# Patient Record
Sex: MALE | Race: WHITE | Employment: OTHER | URBAN - METROPOLITAN AREA
[De-identification: names, ages, dates, MRNs, and addresses within clinical notes are randomized per-mention and may not be internally consistent; named-entity substitution may affect disease eponyms.]

---

## 2017-01-16 ENCOUNTER — APPOINTMENT (OUTPATIENT)
Dept: GENERAL RADIOLOGY | Age: 67
End: 2017-01-16
Attending: EMERGENCY MEDICINE
Payer: COMMERCIAL

## 2017-01-16 ENCOUNTER — HOSPITAL ENCOUNTER (EMERGENCY)
Age: 67
Discharge: HOME | End: 2017-01-16
Attending: EMERGENCY MEDICINE | Admitting: EMERGENCY MEDICINE
Payer: COMMERCIAL

## 2017-01-16 VITALS
RESPIRATION RATE: 20 BRPM | HEIGHT: 72 IN | HEART RATE: 93 BPM | BODY MASS INDEX: 39.86 KG/M2 | OXYGEN SATURATION: 97 % | DIASTOLIC BLOOD PRESSURE: 53 MMHG | TEMPERATURE: 97.9 F | WEIGHT: 294.31 LBS | SYSTOLIC BLOOD PRESSURE: 110 MMHG

## 2017-01-16 DIAGNOSIS — J20.9 ACUTE BRONCHITIS, UNSPECIFIED ORGANISM: Primary | ICD-10-CM

## 2017-01-16 LAB
ALBUMIN SERPL BCP-MCNC: 3.4 G/DL (ref 3.5–5)
ALBUMIN/GLOB SERPL: 0.8 {RATIO} (ref 1.1–2.2)
ALP SERPL-CCNC: 118 U/L (ref 45–117)
ALT SERPL-CCNC: 86 U/L (ref 12–78)
ANION GAP BLD CALC-SCNC: 10 MMOL/L (ref 5–15)
AST SERPL W P-5'-P-CCNC: 42 U/L (ref 15–37)
ATRIAL RATE: 100 BPM
BASOPHILS # BLD AUTO: 0 K/UL (ref 0–0.1)
BASOPHILS # BLD: 0 % (ref 0–1)
BILIRUB SERPL-MCNC: 0.3 MG/DL (ref 0.2–1)
BUN SERPL-MCNC: 24 MG/DL (ref 6–20)
BUN/CREAT SERPL: 18 (ref 12–20)
CALCIUM SERPL-MCNC: 9.1 MG/DL (ref 8.5–10.1)
CALCULATED P AXIS, ECG09: 18 DEGREES
CALCULATED R AXIS, ECG10: -53 DEGREES
CALCULATED T AXIS, ECG11: 42 DEGREES
CHLORIDE SERPL-SCNC: 106 MMOL/L (ref 97–108)
CK SERPL-CCNC: 297 U/L (ref 39–308)
CO2 SERPL-SCNC: 26 MMOL/L (ref 21–32)
CREAT SERPL-MCNC: 1.34 MG/DL (ref 0.7–1.3)
DIAGNOSIS, 93000: NORMAL
EOSINOPHIL # BLD: 0.1 K/UL (ref 0–0.4)
EOSINOPHIL NFR BLD: 1 % (ref 0–7)
ERYTHROCYTE [DISTWIDTH] IN BLOOD BY AUTOMATED COUNT: 15 % (ref 11.5–14.5)
GLOBULIN SER CALC-MCNC: 4.2 G/DL (ref 2–4)
GLUCOSE SERPL-MCNC: 215 MG/DL (ref 65–100)
HCT VFR BLD AUTO: 41.1 % (ref 36.6–50.3)
HGB BLD-MCNC: 13 G/DL (ref 12.1–17)
LYMPHOCYTES # BLD AUTO: 24 % (ref 12–49)
LYMPHOCYTES # BLD: 2.4 K/UL (ref 0.8–3.5)
MCH RBC QN AUTO: 26.5 PG (ref 26–34)
MCHC RBC AUTO-ENTMCNC: 31.6 G/DL (ref 30–36.5)
MCV RBC AUTO: 83.9 FL (ref 80–99)
MONOCYTES # BLD: 0.9 K/UL (ref 0–1)
MONOCYTES NFR BLD AUTO: 9 % (ref 5–13)
NEUTS SEG # BLD: 6.8 K/UL (ref 1.8–8)
NEUTS SEG NFR BLD AUTO: 66 % (ref 32–75)
P-R INTERVAL, ECG05: 144 MS
PLATELET # BLD AUTO: 298 K/UL (ref 150–400)
POTASSIUM SERPL-SCNC: 4.4 MMOL/L (ref 3.5–5.1)
PROT SERPL-MCNC: 7.6 G/DL (ref 6.4–8.2)
Q-T INTERVAL, ECG07: 356 MS
QRS DURATION, ECG06: 96 MS
QTC CALCULATION (BEZET), ECG08: 459 MS
RBC # BLD AUTO: 4.9 M/UL (ref 4.1–5.7)
SODIUM SERPL-SCNC: 142 MMOL/L (ref 136–145)
TROPONIN I SERPL-MCNC: <0.04 NG/ML
VENTRICULAR RATE, ECG03: 100 BPM
WBC # BLD AUTO: 10.1 K/UL (ref 4.1–11.1)

## 2017-01-16 PROCEDURE — 71020 XR CHEST PA LAT: CPT

## 2017-01-16 PROCEDURE — 85025 COMPLETE CBC W/AUTO DIFF WBC: CPT | Performed by: EMERGENCY MEDICINE

## 2017-01-16 PROCEDURE — 36415 COLL VENOUS BLD VENIPUNCTURE: CPT | Performed by: EMERGENCY MEDICINE

## 2017-01-16 PROCEDURE — 93005 ELECTROCARDIOGRAM TRACING: CPT

## 2017-01-16 PROCEDURE — 82550 ASSAY OF CK (CPK): CPT | Performed by: EMERGENCY MEDICINE

## 2017-01-16 PROCEDURE — 84484 ASSAY OF TROPONIN QUANT: CPT | Performed by: EMERGENCY MEDICINE

## 2017-01-16 PROCEDURE — 80053 COMPREHEN METABOLIC PANEL: CPT | Performed by: EMERGENCY MEDICINE

## 2017-01-16 PROCEDURE — 99284 EMERGENCY DEPT VISIT MOD MDM: CPT

## 2017-01-16 RX ORDER — CODEINE PHOSPHATE AND GUAIFENESIN 10; 100 MG/5ML; MG/5ML
5 SOLUTION ORAL
Qty: 118 ML | Refills: 0 | Status: SHIPPED | OUTPATIENT
Start: 2017-01-16 | End: 2017-05-12

## 2017-01-16 RX ORDER — BENZONATATE 100 MG/1
100 CAPSULE ORAL
Qty: 20 CAP | Refills: 0 | Status: SHIPPED | OUTPATIENT
Start: 2017-01-16 | End: 2017-01-23

## 2017-01-17 NOTE — ED NOTES
Discharge instructions given to and reviewed with patient by Dr. Javan Toro. Patient verbalized his understanding and was discharged home ambulatory in [facility].

## 2017-01-17 NOTE — ED NOTES
Assumed care of patient fro shift change. Patient alert and oriented x 3 and on cardiac monitor x 3. VSS. Patient deneis c/o pain or SOB at this time. Lumgs CTAB. Patient in NAD and watching television. Call bell at side. Wife in room. Dr. Nomi Bae into update.

## 2017-01-17 NOTE — DISCHARGE INSTRUCTIONS
Bronchitis: Care Instructions  Your Care Instructions    Bronchitis is inflammation of the bronchial tubes, which carry air to the lungs. The tubes swell and produce mucus, or phlegm. The mucus and inflamed bronchial tubes make you cough. You may have trouble breathing. Most cases of bronchitis are caused by viruses like those that cause colds. Antibiotics usually do not help and they may be harmful. Bronchitis usually develops rapidly and lasts about 2 to 3 weeks in otherwise healthy people. Follow-up care is a key part of your treatment and safety. Be sure to make and go to all appointments, and call your doctor if you are having problems. It's also a good idea to know your test results and keep a list of the medicines you take. How can you care for yourself at home? · Take all medicines exactly as prescribed. Call your doctor if you think you are having a problem with your medicine. · Get some extra rest.  · Take an over-the-counter pain medicine, such as acetaminophen (Tylenol), ibuprofen (Advil, Motrin), or naproxen (Aleve) to reduce fever and relieve body aches. Read and follow all instructions on the label. · Do not take two or more pain medicines at the same time unless the doctor told you to. Many pain medicines have acetaminophen, which is Tylenol. Too much acetaminophen (Tylenol) can be harmful. · Take an over-the-counter cough medicine that contains dextromethorphan to help quiet a dry, hacking cough so that you can sleep. Avoid cough medicines that have more than one active ingredient. Read and follow all instructions on the label. · Breathe moist air from a humidifier, hot shower, or sink filled with hot water. The heat and moisture will thin mucus so you can cough it out. · Do not smoke. Smoking can make bronchitis worse. If you need help quitting, talk to your doctor about stop-smoking programs and medicines. These can increase your chances of quitting for good.   When should you call for help? Call 911 anytime you think you may need emergency care. For example, call if:  · You have severe trouble breathing. Call your doctor now or seek immediate medical care if:  · You have new or worse trouble breathing. · You cough up dark brown or bloody mucus (sputum). · You have a new or higher fever. · You have a new rash. Watch closely for changes in your health, and be sure to contact your doctor if:  · You cough more deeply or more often, especially if you notice more mucus or a change in the color of your mucus. · You are not getting better as expected. Where can you learn more? Go to http://massimo-ese.info/. Enter H333 in the search box to learn more about \"Bronchitis: Care Instructions. \"  Current as of: May 23, 2016  Content Version: 11.1  © 8795-9716 [employer]. Care instructions adapted under license by [employer] (which disclaims liability or warranty for this information). If you have questions about a medical condition or this instruction, always ask your healthcare professional. [address] any warranty or liability for your use of this information.

## 2017-01-17 NOTE — ED PROVIDER NOTES
HPI Comments: Sarath Sierra is a 73 y.o. Male former tobacco user (1.5 ppd x 25yrs) who has a h/o GERD, CAD (4 stents), DM and HTN presents to ED [facility] ED ambulatory with cc intermittent symptoms of congestion, and associated symptoms of a productive \"green sputum\" cough, wheezing, diaphoresis and chills x 3 weeks. The patient has seen his PCP in regards to his current symptoms and she prescribed him Augmentin on 12/30 and Azithromycin on 1/9 as well as prednisone. The patient states that these interventions have kept his symptoms from getting worse, however he only reports minimal improvement overall. Today in his PCP's office he was given two nebulizer treatments however due to his wheezing symptoms he was referred to the ED for further evaluation. The patient denies any h/o asthma, bronchitis, COPD, CHF, DVT or PE. He denies all other symptoms at this time, including any sinus pressure, chest pain, fevers, sob, hemoptysis or nausea. Of note, triage note reports complaint of dyspnea but patient denies every having dyspnea or chest pain. PCP: Dr. Randal Willoughby      There are no other complaints changes or physical findings at this time  Written by Maddi Robbins ED Scribshanda as dictated by Martita Coburn MD.    The history is provided by the patient.         Past Medical History:   Diagnosis Date    Adverse effect of anesthesia      sleep apnea   uses cpap machine doesnsot generally use it away from home per pt     Arthritis of knee     CAD (coronary artery disease)      s/p stents 2008 4 stents     GERD (gastroesophageal reflux disease)     JOE (obstructive sleep apnea)      on CPAP    Other and unspecified hyperlipidemia     Type II or unspecified type diabetes mellitus without mention of complication, uncontrolled     Unspecified essential hypertension        Past Surgical History:   Procedure Laterality Date    Hx knee arthroscopy       x2 on right    Hx orthopaedic  7/27/15     LEFT TIBIAL OPEN REDUCTION INTERNAL FIXATION     Pr cardiac surg procedure unlist       x4    Hx heent       left orbital fracture with plate    Hx orthopaedic       right shoulder surgery     Hx orthopaedic       s/p fall sheba in left leg     Hx cervical fusion  March 2016     C6-7         Family History:   Problem Relation Age of Onset    Diabetes Mother     Heart Disease Mother      in her 46s    Heart Disease Father      in his 62s    Diabetes Brother     Heart Disease Brother      congenital, valve replacement    Heart Surgery Brother     Diabetes Brother     No Known Problems Sister     Stroke Neg Hx        Social History     Social History    Marital status:      Spouse name: N/A    Number of children: N/A    Years of education: N/A     Occupational History    Not on file. Social History Main Topics    Smoking status: Former Smoker     Packs/day: 1.50     Years: 25.00     Quit date: 2/1/2006    Smokeless tobacco: Never Used    Alcohol use No    Drug use: Yes     Special: Prescription, OTC    Sexual activity: Not on file     Other Topics Concern    Not on file     Social History Narrative    Lives in [facility] with wife of 9 years, 2 daughters and 1 son from a previous marriage and 3 step-children. 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 Likes [employer]. ALLERGIES: Review of patient's allergies indicates no known allergies. Review of Systems   Constitutional: Positive for chills and diaphoresis. Negative for fatigue and fever. HENT: Positive for congestion. Negative for rhinorrhea, sinus pressure and sore throat. Eyes: Negative for pain, discharge and visual disturbance. Respiratory: Positive for cough and wheezing. Negative for chest tightness and shortness of breath. Cardiovascular: Negative for chest pain, palpitations and leg swelling. Gastrointestinal: Negative for abdominal pain, constipation, diarrhea, nausea and vomiting. Genitourinary: Negative for dysuria, frequency and hematuria. Musculoskeletal: Negative for arthralgias, back pain and myalgias. Skin: Negative for rash. Neurological: Negative for dizziness, weakness, light-headedness and headaches. Psychiatric/Behavioral: Negative. All other systems reviewed and are negative. Patient Vitals for the past 12 hrs:   Temp Pulse Resp BP SpO2   01/16/17 1930 - - - 110/53 97 %   01/16/17 1912 - 93 20 120/61 95 %   01/16/17 1617 - 94 22 127/61 96 %   01/16/17 1412 97.9 °F (36.6 °C) (!) 106 22 114/63 96 %       Physical Exam   Constitutional: He is oriented to person, place, and time. He appears well-developed and well-nourished. No distress. HENT:   Head: Normocephalic and atraumatic. Nose: Right sinus exhibits no maxillary sinus tenderness and no frontal sinus tenderness. Left sinus exhibits no maxillary sinus tenderness and no frontal sinus tenderness. Mouth/Throat: Oropharynx is clear and moist. No oropharyngeal exudate. Eyes: EOM are normal. Right eye exhibits no discharge. Left eye exhibits no discharge. No scleral icterus. Neck: Normal range of motion. Neck supple. No tracheal deviation present. Cardiovascular: Normal rate, regular rhythm, normal heart sounds and intact distal pulses. Exam reveals no gallop and no friction rub. No murmur heard. Pulmonary/Chest: Effort normal and breath sounds normal. No respiratory distress. He has no wheezes. He has no rales. Abdominal: Soft. He exhibits no distension. There is no tenderness. Musculoskeletal: Normal range of motion. He exhibits edema (Trace BLE). Lymphadenopathy:     He has no cervical adenopathy. Neurological: He is alert and oriented to person, place, and time. Skin: Skin is warm and dry. No rash noted. Psychiatric: He has a normal mood and affect.         MDM  Number of Diagnoses or Management Options  Acute bronchitis, unspecified organism:   Diagnosis management comments: Patient presents to ED with congestion and cough. He has completed two antibiotics with improvement in symptoms but symptoms have not resolved. He denies chest pain and dyspnea. He has a normal pulmonary exam without wheezing. CBC, CMP, CXR unremarkable. Yanci sent from triage which were also unremarkable. Symptoms likely secondary to bronchitis. No indication for additional antibiotics or steroids at this time given exam.  Will provide symptomatic treatment and have patient follow up with PCP. Discussed results, prescriptions and follow up plan with patient. Provided customary return to ED instructions. Patient expressed understanding. Aamir Parihk MD           Amount and/or Complexity of Data Reviewed  Clinical lab tests: reviewed and ordered  Tests in the radiology section of CPT®: ordered and reviewed  Tests in the medicine section of CPT®: ordered and reviewed  Review and summarize past medical records: yes  Independent visualization of images, tracings, or specimens: yes    Patient Progress  Patient progress: stable    ED Course       Procedures      EKG interpretation: (Preliminary) 1418  Rhythm: normal sinus rhythm; and regular . Rate (approx.): 100; Axis: normal; P wave: normal; QRS interval: normal ; ST/T wave: normal; LVH; Left anterior vesicular block.       LABORATORY TESTS:  Recent Results (from the past 12 hour(s))   EKG, 12 LEAD, INITIAL    Collection Time: 01/16/17  2:18 PM   Result Value Ref Range    Ventricular Rate 100 BPM    Atrial Rate 100 BPM    P-R Interval 144 ms    QRS Duration 96 ms    Q-T Interval 356 ms    QTC Calculation (Bezet) 459 ms    Calculated P Axis 18 degrees    Calculated R Axis -53 degrees    Calculated T Axis 42 degrees    Diagnosis       Normal sinus rhythm  Incomplete right bundle branch block  Left anterior fascicular block  Minimal voltage criteria for LVH, may be normal variant  Abnormal ECG  When compared with ECG of 26-JUL-2015 21:17,  T wave inversion no longer evident in Inferior leads  Nonspecific T wave abnormality, improved in Lateral leads  Confirmed by Michael Calderon ([id number]) on 1/16/2017 6:17:23 PM     CBC WITH AUTOMATED DIFF    Collection Time: 01/16/17  3:10 PM   Result Value Ref Range    WBC 10.1 4.1 - 11.1 K/uL    RBC 4.90 4. 10 - 5.70 M/uL    HGB 13.0 12.1 - 17.0 g/dL    HCT 41.1 36.6 - 50.3 %    MCV 83.9 80.0 - 99.0 FL    MCH 26.5 26.0 - 34.0 PG    MCHC 31.6 30.0 - 36.5 g/dL    RDW 15.0 (H) 11.5 - 14.5 %    PLATELET 755 869 - 851 K/uL    NEUTROPHILS 66 32 - 75 %    LYMPHOCYTES 24 12 - 49 %    MONOCYTES 9 5 - 13 %    EOSINOPHILS 1 0 - 7 %    BASOPHILS 0 0 - 1 %    ABS. NEUTROPHILS 6.8 1.8 - 8.0 K/UL    ABS. LYMPHOCYTES 2.4 0.8 - 3.5 K/UL    ABS. MONOCYTES 0.9 0.0 - 1.0 K/UL    ABS. EOSINOPHILS 0.1 0.0 - 0.4 K/UL    ABS. BASOPHILS 0.0 0.0 - 0.1 K/UL   METABOLIC PANEL, COMPREHENSIVE    Collection Time: 01/16/17  3:10 PM   Result Value Ref Range    Sodium 142 136 - 145 mmol/L    Potassium 4.4 3.5 - 5.1 mmol/L    Chloride 106 97 - 108 mmol/L    CO2 26 21 - 32 mmol/L    Anion gap 10 5 - 15 mmol/L    Glucose 215 (H) 65 - 100 mg/dL    BUN 24 (H) 6 - 20 MG/DL    Creatinine 1.34 (H) 0.70 - 1.30 MG/DL    BUN/Creatinine ratio 18 12 - 20      GFR est AA >60 >60 ml/min/1.73m2    GFR est non-AA 54 (L) >60 ml/min/1.73m2    Calcium 9.1 8.5 - 10.1 MG/DL    Bilirubin, total 0.3 0.2 - 1.0 MG/DL    ALT 86 (H) 12 - 78 U/L    AST 42 (H) 15 - 37 U/L    Alk.  phosphatase 118 (H) 45 - 117 U/L    Protein, total 7.6 6.4 - 8.2 g/dL    Albumin 3.4 (L) 3.5 - 5.0 g/dL    Globulin 4.2 (H) 2.0 - 4.0 g/dL    A-G Ratio 0.8 (L) 1.1 - 2.2     CK W/ REFLX CKMB    Collection Time: 01/16/17  3:10 PM   Result Value Ref Range     39 - 308 U/L   TROPONIN I    Collection Time: 01/16/17  3:10 PM   Result Value Ref Range    Troponin-I, Qt. <0.04 <0.05 ng/mL       IMAGING RESULTS:  CXR Results  (Last 48 hours)               01/16/17 7537  XR CHEST PA LAT Final result    Impression: IMPRESSION: No acute abnormality. Narrative:  EXAM:  XR CHEST PA LAT. INDICATION: Short of breath. COMPARISON: 3/8/2016. FINDINGS:    PA and lateral radiographs of the chest were obtained. Lungs: The lungs are clear of mass, nodule, airspace disease or edema. Pleura: There is no pleural effusion or pneumothorax. Mediastinum: The cardiac and mediastinal contours and pulmonary vascularity are   normal.   Bones and soft tissues: There is a plate and screws in the cervical spine. There   are degenerative changes of the thoracic spine. MEDICATIONS GIVEN:  Medications - No data to display    IMPRESSION:  1. Acute bronchitis, unspecified organism        PLAN:  1. Discharge Medication List as of 1/16/2017  7:33 PM      START taking these medications    Details   guaiFENesin-codeine (ROBITUSSIN AC) 100-10 mg/5 mL solution Take 5 mL by mouth three (3) times daily as needed for Cough. Max Daily Amount: 15 mL. , Print, Disp-118 mL, R-0         CONTINUE these medications which have NOT CHANGED    Details   Liraglutide (VICTOZA 3-STEFANIE) 0.6 mg/0.1 mL (18 mg/3 mL) sub-q pen INJECT 1.8 MG UNDER THE SKIN DAILY, Normal, Disp-27 mL, R-3      insulin lispro (HUMALOG) 100 unit/mL kwikpen Inject 30 units before dinner, Normal, Disp-30 mL, R-3      atorvastatin (LIPITOR) 80 mg tablet Take 1 Tab by mouth daily. , Normal, Disp-90 Tab, R-3      atenolol (TENORMIN) 25 mg tablet Take 1 Tab by mouth daily. , Normal, Disp-90 Tab, R-3      FREESTYLE LITE STRIPS strip TEST THREE TIMES A DAY, Normal, Disp-200 Strip, R-2, TIO      lisinopril (PRINIVIL, ZESTRIL) 10 mg tablet TAKE 1 TABLET DAILY, Normal, Disp-90 Tab, R-3      metFORMIN (GLUCOPHAGE) 1,000 mg tablet TAKE 1 TABLET TWICE A DAY WITH MEALS, Normal, Disp-180 Tab, R-3      docusate sodium (COLACE) 100 mg capsule Take 100 mg by mouth nightly., Historical Med      lansoprazole (PREVACID) 30 mg capsule Take  by mouth Daily (before breakfast). , Historical Med      FREESTYLE SYSTEM KIT monitoring kit Test 3 times daily Dx: 250.02, Normal, Disp-1 Kit, R-0, TIO      FREESTYLE LANCETS 28 gauge misc Test 3 times daily Dx: 250.02, Normal, Disp-100 Each, R-11, TIO      aspirin delayed-release 81 mg tablet Take 162 mg by mouth nightly., Historical Med      BD INSULIN PEN NEEDLE UF SHORT 31 X 5/16 \" ndle USE AS DIRECTED 3 TIMES DAILY, Normal, Disp-300 Package, R-3, TIO      CETIRIZINE HCL (ZYRTEC PO) Take 10 mg by mouth nightly., Historical Med      coenzyme q10 200 mg Cap Take  by mouth nightly., Historical Med      GLUCOSAMINE SULFATE (GLUCOSAMINE PO) Take  by mouth as directed., Historical Med      MULTIVITAMINS (MULTIVITAMIN PO) Take  by mouth daily. , Historical Med      diclofenac EC (VOLTAREN) 75 mg EC tablet Historical Med      insulin glargine (LANTUS SOLOSTAR) 100 unit/mL (3 mL) pen Inject 80 units in the morning and 80 units in the evening as 2 separate injections of 40 units in different locations, Normal, Disp-150 mL, R-3           2. Follow-up Information     Follow up With Details Comments Contact Info    Mo Hurst MD In 3 days  [address] [facility]  [address]  [address] [phone]      [facility] EMERGENCY DEPT  As needed, If symptoms worsen [address]  [phone]        Return to ED if worse     Discharge Note:  7:45 PM  The patient is ready for discharge. The patient's signs, symptoms, diagnosis, and discharge instructions have been discussed and the patient has conveyed their understanding. The patient is to follow up as recommended or return to the ER should their symptoms worsen. Plan has been discussed and the patient is in agreement. This note is prepared by Gricelda Manrique acting as Scribe for Master Castillo MD.    Master Castillo MD: The Scribe's documentation has been prepared under my direction and personally reviewed by me in its entirety.  I confirm that the note above accurately reflects all work, treatment, procedures, and medical decision making performed by me.

## 2017-01-17 NOTE — ED NOTES
Bedside and Verbal shift change report given to [address] [place] [address] (oncoming nurse) by Jaime Groves RN (offgoing nurse). Report included the following information SBAR and ED Summary.

## 2017-01-17 NOTE — ED NOTES
Pt presents to ED for SOB x 3 weeks, cough x productive with green mucus. Pt went to PCP and was sent to ED. Pt alert and oriented x 4. Pt denies other symptoms. No chest pain. Pt reports sore throat and throat tightness.

## 2017-03-03 RX ORDER — BLOOD-GLUCOSE METER
KIT MISCELLANEOUS
Qty: 300 STRIP | Refills: 3 | Status: SHIPPED | OUTPATIENT
Start: 2017-03-03 | End: 2018-05-31 | Stop reason: SDUPTHER

## 2017-05-05 ENCOUNTER — LAB ONLY (OUTPATIENT)
Dept: ENDOCRINOLOGY | Age: 67
End: 2017-05-05

## 2017-05-12 ENCOUNTER — OFFICE VISIT (OUTPATIENT)
Dept: ENDOCRINOLOGY | Age: 67
End: 2017-05-12

## 2017-05-12 VITALS
HEIGHT: 72 IN | BODY MASS INDEX: 41.23 KG/M2 | DIASTOLIC BLOOD PRESSURE: 72 MMHG | WEIGHT: 304.4 LBS | SYSTOLIC BLOOD PRESSURE: 146 MMHG | HEART RATE: 83 BPM

## 2017-05-12 DIAGNOSIS — E78.5 HYPERLIPIDEMIA LDL GOAL <70: ICD-10-CM

## 2017-05-12 DIAGNOSIS — I10 ESSENTIAL HYPERTENSION, BENIGN: ICD-10-CM

## 2017-05-12 NOTE — PROGRESS NOTES
Chief Complaint   Patient presents with    Diabetes     pcp and pharmacy confirmed    Other     consent form signed to obtain eye exam     History of Present Illness: Desi Caldwell is a 73 y.o. male here for follow up of diabetes. Weight stable since last visit in 12/16. Has been taking 30 units of humalog before dinner and taking 80 units of lantus bid as 2 separate shots of 40 units in different locations. Initially this was working well and his sugars were in the 100-120 range in the morning but then needed 2-3 courses of steroids for respiratory infection and after this hasn't been able to get his sugars much below 180 over the past month and has seen readings up to 200-300 at times. Has felt more in a fog with the higher sugars. Still taking metformin and victoza. Compliant with BP and lipid regimen. Has tried to start exercising using the total [employer] for 15-30 minutes over the past 3 weeks. Current Outpatient Prescriptions   Medication Sig    FREESTYLE LITE STRIPS strip USE TO TEST THREE TIMES A DAY    Liraglutide (VICTOZA 3-STEFANIE) 0.6 mg/0.1 mL (18 mg/3 mL) sub-q pen INJECT 1.8 MG UNDER THE SKIN DAILY    insulin lispro (HUMALOG) 100 unit/mL kwikpen Inject 30 units before dinner    diclofenac EC (VOLTAREN) 75 mg EC tablet     atorvastatin (LIPITOR) 80 mg tablet Take 1 Tab by mouth daily.  atenolol (TENORMIN) 25 mg tablet Take 1 Tab by mouth daily.  lisinopril (PRINIVIL, ZESTRIL) 10 mg tablet TAKE 1 TABLET DAILY    insulin glargine (LANTUS SOLOSTAR) 100 unit/mL (3 mL) pen Inject 80 units in the morning and 80 units in the evening as 2 separate injections of 40 units in different locations    metFORMIN (GLUCOPHAGE) 1,000 mg tablet TAKE 1 TABLET TWICE A DAY WITH MEALS    docusate sodium (COLACE) 100 mg capsule Take 100 mg by mouth nightly.  lansoprazole (PREVACID) 30 mg capsule Take  by mouth Daily (before breakfast).     FREESTYLE SYSTEM KIT monitoring kit Test 3 times daily Dx: 250.02    FREESTYLE LANCETS 28 gauge misc Test 3 times daily Dx: 250.02    aspirin delayed-release 81 mg tablet Take 162 mg by mouth nightly.  BD INSULIN PEN NEEDLE UF SHORT 31 X 5/16 \" ndle USE AS DIRECTED 3 TIMES DAILY    CETIRIZINE HCL (ZYRTEC PO) Take 10 mg by mouth nightly.  coenzyme q10 200 mg Cap Take  by mouth nightly.  GLUCOSAMINE SULFATE (GLUCOSAMINE PO) Take  by mouth as directed.  MULTIVITAMINS (MULTIVITAMIN PO) Take  by mouth daily. No current facility-administered medications for this visit. No Known Allergies     Review of Systems:  - Eyes: no blurry vision or double vision  - Cardiovascular: no chest pain  - Respiratory: no shortness of breath  - Musculoskeletal: no myalgias  - Neurological: no numbness/tingling in extremities    Physical Examination:  Blood pressure 146/72, pulse 83, height 6' (1.829 m), weight 304 lb 6.4 oz (138.1 kg). - General: pleasant, no distress, good eye contact   - Neck: no carotid bruits  - Cardiovascular: regular, normal rate, nl s1 and s2, no m/r/g,   - Respiratory: clear bilaterally  - Integumentary: no edema,   - Psychiatric: normal mood and affect    Data Reviewed:   Component      Latest Ref Rng & Units 5/5/2017 5/5/2017           4:34 PM  4:34 PM   Glucose      65 - 99 mg/dL 204 (H)    BUN      8 - 27 mg/dL 14    Creatinine      0.76 - 1.27 mg/dL 0.77    GFR est non-AA      >59 mL/min/1.73 95    GFR est AA      >59 mL/min/1.73 110    BUN/Creatinine ratio      10 - 24 18    Sodium      134 - 144 mmol/L 142    Potassium      3.5 - 5.2 mmol/L 4.3    Chloride      96 - 106 mmol/L 104    CO2      18 - 29 mmol/L 20    Calcium      8.6 - 10.2 mg/dL 9.3    Hemoglobin A1c, (calculated)      4.8 - 5.6 %  8.5 (H)   Estimated average glucose      mg/dL  197       Assessment/Plan:     1.  DM w/o complication type II, uncontrolled (250.02) his most recent Hgb A1c was 8.5% in 5/17 up from 8.2% in 11/16 up from 7.7% in 6/16 down from 8.4% in 3/16 up from 7.5% in 10/15 up from 7.4% in 5/15 stable from 12/14 down from 7.6% in 7/14 stable from 3/14 up from 7.5% in 11/13 down from 7.7% in July up from7.1% in March 2013 down from 7.7% in Nov down from 8.3% in August up from 7.7% in April stable from 7.6% in January 2012 down from 8.8% in October 2011 up from 8.4% in July, 7.4% in April stable from 7.5% in January up from 6.7% in October 2010 down from 8.6% in June. His A1c has been higher after courses of steroids so likely has developed some glucose toxicity and insulin resistance. Provided a correction humalog scale to use as below and if this doesn't work after 2 weeks, will increase the lantus to 90 bid. - cont Lantus 80 units in am and 80 units in pm as 2 separate injections of 40 units   - cont Victoza 1.8 mg daily   - cont Metformin 1000 mg bid  - cont humalog 30 units before dinner + 5 units for every 50 mg/dl above 150 mg/dl  - take humalog 5 units for every 50 mg/dl above 150 mg/dl at breakfast and bedtime  - check bs 3 times daily  - optho UTD 4/15--will obtain report  - microalbumin nl 12/14, up to 43 in 11/16--repeat today  - foot exam done 12/14  - check A1c and cmp prior to next visit     2. Unspecified essential hypertension (401.9) his BP was just above goal < 140/90. May need increase in lisinopril at next visit if BP and microalbumin still high   - cont atenolol 25 mg daily  - cont lisinopril 10 mg daily     3. Other and unspecified hyperlipidemia (272.4) Given DM and CAD, Goal LDL < 70, non-HDL < 100, and TG < 150. Lipids at goal in November 2013 and July 2014 and Oct 2015.   LDL 79 in 11/16 with higher A1c   - cont Lipitor 80 daily  - check lipids prior to next visit       Patient Instructions   1) Check your sugar before dinner and adjust your humalog as listed below:  Blood sugar            Less than 90 Eat first, take 25 units      30 units    151-200  35 units      201-250  40 units     251-300  45 units      301-350  50 units      351-400  55 units    If you miss the humalog before you eat, still plan on taking 20 units if over 30 minutes after eating (if up to 30 minutes, still take 30 units). 2) Check your sugar before breakfast and at bedtime and if your sugar is over 150, take a small dose of humalog based on the scale below:  Blood sugar            151-200  5 units      201-250  10 units     251-300  15 units      301-350  20 units      351-400  25 units    This dose of humalog is IN ADDITION to the lantus 80 units     3) If your sugar is still staying over 180 more than 3 times a week after 2 weeks, then let me know. 4) If your sugar is coming back down and staying under 150, then you will only need to use the scales for humalog as needed. 5) I will send you a reminder letter 3-4 weeks prior to your next visit and you will have the order already in the [facility] system so you can just go sometime in the 3-7 days before the next visit to have your labs drawn. Follow-up Disposition:  Return in about 5 months (around 10/12/2017). Copy sent to:  Dr. Lashon Khoury [phone]    Lab follow up: 5/13/17    Sent him the following message in a letter:    Resulted Orders   MICROALBUMIN, UR, RAND W/ MICROALBUMIN/CREA RATIO   Result Value Ref Range    Creatinine, urine 132.9 Not Estab. mg/dL    Microalbumin, urine 13.9 Not Estab. ug/mL    Microalb/Creat ratio (ug/mg creat.) 10.5 0.0 - 30.0 mg/g creat    Narrative    Performed at:  [address]  [id number]  : Ching Gabriel MD, Phone:  [phone]       Microalbumin/creatinine ratio is a marker of the amount of protein in your urine. Goal is less than 30. Your value is normal. This indicates that your kidneys are not being affected by your uncontrolled diabetes and/or blood pressure.

## 2017-05-12 NOTE — MR AVS SNAPSHOT
Visit Information Date & Time Provider Department Dept. Phone Encounter #  
 5/12/2017  4:10 PM Sara Antoine, [address] [facility] Diabetes and Endocrinology [phone] [id number] Follow-up Instructions Return in about 5 months (around 10/12/2017). Upcoming Health Maintenance Date Due Hepatitis C Screening 1950 DTaP/Tdap/Td series (1 - Tdap) 8/30/1972 FOBT Q 1 YEAR AGE 50-75 8/30/2001 ZOSTER VACCINE AGE 60> 8/30/2011 FOOT EXAM Q1 12/23/2015 EYE EXAM RETINAL OR DILATED Q1 4/1/2016 GLAUCOMA SCREENING Q2Y 8/30/2016 Pneumococcal 65+ Low/Medium Risk (1 of 2 - PCV13) 8/30/2016 MEDICARE YEARLY EXAM 8/30/2016 INFLUENZA AGE 9 TO ADULT 8/1/2017 HEMOGLOBIN A1C Q6M 11/5/2017 MICROALBUMIN Q1 11/29/2017 LIPID PANEL Q1 11/29/2017 Allergies as of 5/12/2017  Review Complete On: 5/12/2017 By: Sara Antoine MD  
 No Known Allergies Current Immunizations  Never Reviewed No immunizations on file. Not reviewed this visit You Were Diagnosed With   
  
 Codes Comments Uncontrolled type 2 diabetes mellitus with complication, with long-term current use of insulin (HCC)    -  Primary ICD-10-CM: E11.8, E11.65, Z79.4 ICD-9-CM: 250.82, V58.67 Vitals BP Pulse Height(growth percentile) Weight(growth percentile) BMI Smoking Status 146/72 83 6' (1.829 m) 304 lb 6.4 oz (138.1 kg) 41.28 kg/m2 Former Smoker Vitals History BMI and BSA Data Body Mass Index Body Surface Area  
 41.28 kg/m 2 2.65 m 2 Preferred Pharmacy Pharmacy Name Phone [address] [facility] [phone] Your Updated Medication List  
  
   
This list is accurate as of: 5/12/17  5:20 PM.  Always use your most recent med list.  
  
  
  
  
 aspirin delayed-release 81 mg tablet Take 162 mg by mouth nightly. atenolol 25 mg tablet Commonly known as:  TENORMIN  
 Take 1 Tab by mouth daily. atorvastatin 80 mg tablet Commonly known as:  LIPITOR Take 1 Tab by mouth daily. BD INSULIN PEN NEEDLE UF SHORT 31 gauge x 5/16\" Ndle Generic drug:  Insulin Needles (Disposable) USE AS DIRECTED 3 TIMES DAILY coenzyme Q-10 200 mg capsule Commonly known as:  CO Q-10 Take  by mouth nightly. diclofenac EC 75 mg EC tablet Commonly known as:  VOLTAREN  
  
 docusate sodium 100 mg capsule Commonly known as:  Adilia Moan Take 100 mg by mouth nightly. FREESTYLE LANCETS 28 gauge Misc Generic drug:  lancets Test 3 times daily Dx: 250.02  
  
 FREESTYLE LITE STRIPS strip Generic drug:  glucose blood VI test strips USE TO TEST THREE TIMES A DAY  
  
 FREESTYLE SYSTEM KIT monitoring kit Generic drug:  Blood-Glucose Meter Test 3 times daily Dx: 250.02  
  
 GLUCOSAMINE PO Take  by mouth as directed. insulin glargine 100 unit/mL (3 mL) pen Commonly known as:  LANTUS SOLOSTAR Inject 80 units in the morning and 80 units in the evening as 2 separate injections of 40 units in different locations  
  
 insulin lispro 100 unit/mL kwikpen Commonly known as:  HUMALOG Inject 30 units before dinner  
  
 lansoprazole 30 mg capsule Commonly known as:  PREVACID Take  by mouth Daily (before breakfast). Liraglutide 0.6 mg/0.1 mL (18 mg/3 mL) sub-q pen Commonly known as:  Carina Newman 3-STEFANIE INJECT 1.8 MG UNDER THE SKIN DAILY  
  
 lisinopril 10 mg tablet Commonly known as:  PRINIVIL, ZESTRIL  
TAKE 1 TABLET DAILY  
  
 metFORMIN 1,000 mg tablet Commonly known as:  GLUCOPHAGE  
TAKE 1 TABLET TWICE A DAY WITH MEALS  
  
 MULTIVITAMIN PO Take  by mouth daily. ZYRTEC PO Take 10 mg by mouth nightly. We Performed the Following MICROALBUMIN, UR, RAND W/ MICROALBUMIN/CREA RATIO [id number] CPT(R)] Follow-up Instructions Return in about 5 months (around 10/12/2017). Patient Instructions 1) Check your sugar before dinner and adjust your humalog as listed below: 
Blood sugar Less than 90 Eat first, take 25 units   30 units 151-200  35 units 201-250  40 units 251-300  45 units 301-350  50 units 351-400  55 units If you miss the humalog before you eat, still plan on taking 20 units if over 30 minutes after eating (if up to 30 minutes, still take 30 units). 2) Check your sugar before breakfast and at bedtime and if your sugar is over 150, take a small dose of humalog based on the scale below: 
Blood sugar 151-200  5 units 201-250  10 units 251-300  15 units 301-350  20 units 351-400  25 units This dose of humalog is IN ADDITION to the lantus 80 units 3) If your sugar is still staying over 180 more than 3 times a week after 2 weeks, then let me know. 4) If your sugar is coming back down and staying under 150, then you will only need to use the scales for humalog as needed. 5) I will send you a reminder letter 3-4 weeks prior to your next visit and you will have the order already in the [facility] system so you can just go sometime in the 3-7 days before the next visit to have your labs drawn. Introducing [facility]! Dear Jorge Mcgee: 
Thank you for requesting a [employer] account. Our records indicate that you already have an active [employer] account. You can access your account anytime at [email or web address] "[email or web address] Did you know that you can access your hospital and ER discharge instructions at any time in [employer]? You can also review all of your test results from your hospital stay or ER visit. Additional Information If you have questions, please visit the Frequently Asked Questions section of the [employer] website at [email or web address] "[email or web address]/. Remember, [employer] is NOT to be used for urgent needs. For medical emergencies, dial 911. Now available from your iPhone and Android! Please provide this summary of care documentation to your next provider. Your primary care clinician is listed as Cadence Carbajal. If you have any questions after today's visit, please call [phone].

## 2017-05-13 LAB
ALBUMIN/CREAT UR: 10.5 MG/G CREAT (ref 0–30)
BUN SERPL-MCNC: 14 MG/DL (ref 8–27)
BUN/CREAT SERPL: 18 (ref 10–24)
CALCIUM SERPL-MCNC: 9.3 MG/DL (ref 8.6–10.2)
CHLORIDE SERPL-SCNC: 104 MMOL/L (ref 96–106)
CO2 SERPL-SCNC: 20 MMOL/L (ref 18–29)
CREAT SERPL-MCNC: 0.77 MG/DL (ref 0.76–1.27)
CREAT UR-MCNC: 132.9 MG/DL
EST. AVERAGE GLUCOSE BLD GHB EST-MCNC: 197 MG/DL
GLUCOSE SERPL-MCNC: 204 MG/DL (ref 65–99)
HBA1C MFR BLD: 8.5 % (ref 4.8–5.6)
Lab: NORMAL
MICROALBUMIN UR-MCNC: 13.9 UG/ML
POTASSIUM SERPL-SCNC: 4.3 MMOL/L (ref 3.5–5.2)
SODIUM SERPL-SCNC: 142 MMOL/L (ref 134–144)

## 2017-05-24 RX ORDER — METFORMIN HYDROCHLORIDE 1000 MG/1
TABLET ORAL
Qty: 180 TAB | Refills: 3 | Status: SHIPPED | OUTPATIENT
Start: 2017-05-24 | End: 2018-05-20 | Stop reason: SDUPTHER

## 2017-08-07 RX ORDER — LISINOPRIL 10 MG/1
TABLET ORAL
Qty: 90 TAB | Refills: 3 | Status: SHIPPED | OUTPATIENT
Start: 2017-08-07 | End: 2018-08-02 | Stop reason: SDUPTHER

## 2017-09-13 DIAGNOSIS — I10 ESSENTIAL HYPERTENSION, BENIGN: ICD-10-CM

## 2017-09-13 DIAGNOSIS — E78.5 HYPERLIPIDEMIA LDL GOAL <70: ICD-10-CM

## 2017-10-28 LAB
ALBUMIN SERPL-MCNC: 4.3 G/DL (ref 3.6–4.8)
ALBUMIN/GLOB SERPL: 1.7 {RATIO} (ref 1.2–2.2)
ALP SERPL-CCNC: 111 IU/L (ref 39–117)
ALT SERPL-CCNC: 53 IU/L (ref 0–44)
AST SERPL-CCNC: 40 IU/L (ref 0–40)
BILIRUB SERPL-MCNC: 0.4 MG/DL (ref 0–1.2)
BUN SERPL-MCNC: 17 MG/DL (ref 8–27)
BUN/CREAT SERPL: 18 (ref 10–24)
CALCIUM SERPL-MCNC: 10.2 MG/DL (ref 8.6–10.2)
CHLORIDE SERPL-SCNC: 106 MMOL/L (ref 96–106)
CHOLEST SERPL-MCNC: 111 MG/DL (ref 100–199)
CO2 SERPL-SCNC: 24 MMOL/L (ref 18–29)
CREAT SERPL-MCNC: 0.93 MG/DL (ref 0.76–1.27)
EST. AVERAGE GLUCOSE BLD GHB EST-MCNC: 154 MG/DL
GFR SERPLBLD CREATININE-BSD FMLA CKD-EPI: 85 ML/MIN/1.73
GFR SERPLBLD CREATININE-BSD FMLA CKD-EPI: 99 ML/MIN/1.73
GLOBULIN SER CALC-MCNC: 2.6 G/DL (ref 1.5–4.5)
GLUCOSE SERPL-MCNC: 171 MG/DL (ref 65–99)
HBA1C MFR BLD: 7 % (ref 4.8–5.6)
HDLC SERPL-MCNC: 35 MG/DL
LDLC SERPL CALC-MCNC: 57 MG/DL (ref 0–99)
POTASSIUM SERPL-SCNC: 5.1 MMOL/L (ref 3.5–5.2)
PROT SERPL-MCNC: 6.9 G/DL (ref 6–8.5)
SODIUM SERPL-SCNC: 147 MMOL/L (ref 134–144)
TRIGL SERPL-MCNC: 95 MG/DL (ref 0–149)
VLDLC SERPL CALC-MCNC: 19 MG/DL (ref 5–40)

## 2017-10-31 ENCOUNTER — OFFICE VISIT (OUTPATIENT)
Dept: ENDOCRINOLOGY | Age: 67
End: 2017-10-31

## 2017-10-31 VITALS
WEIGHT: 301.2 LBS | HEIGHT: 72 IN | BODY MASS INDEX: 40.8 KG/M2 | HEART RATE: 75 BPM | SYSTOLIC BLOOD PRESSURE: 118 MMHG | DIASTOLIC BLOOD PRESSURE: 68 MMHG

## 2017-10-31 DIAGNOSIS — I10 ESSENTIAL HYPERTENSION, BENIGN: ICD-10-CM

## 2017-10-31 DIAGNOSIS — E78.5 HYPERLIPIDEMIA LDL GOAL <70: ICD-10-CM

## 2017-10-31 RX ORDER — AMLODIPINE AND VALSARTAN 5; 160 MG/1; MG/1
TABLET ORAL
Refills: 2 | COMMUNITY
Start: 2017-10-24 | End: 2019-03-01

## 2017-10-31 RX ORDER — CEPHALEXIN 500 MG/1
CAPSULE ORAL
Refills: 6 | COMMUNITY
Start: 2017-10-15 | End: 2018-10-29

## 2017-10-31 NOTE — MR AVS SNAPSHOT
Visit Information Date & Time Provider Department Dept. Phone Encounter #  
 10/31/2017  3:50 PM Stan Davison, [facility] Diabetes and Endocrinology [phone] Follow-up Instructions Return in about 6 months (around 4/30/2018). Upcoming Health Maintenance Date Due Hepatitis C Screening 1950 DTaP/Tdap/Td series (1 - Tdap) 8/30/1972 FOBT Q 1 YEAR AGE 50-75 8/30/2001 ZOSTER VACCINE AGE 60> 6/30/2011 FOOT EXAM Q1 12/23/2015 EYE EXAM RETINAL OR DILATED Q1 4/1/2016 GLAUCOMA SCREENING Q2Y 8/30/2016 Pneumococcal 65+ Low/Medium Risk (1 of 2 - PCV13) 8/30/2016 MEDICARE YEARLY EXAM 8/30/2016 INFLUENZA AGE 9 TO ADULT 8/1/2017 HEMOGLOBIN A1C Q6M 4/27/2018 MICROALBUMIN Q1 5/12/2018 LIPID PANEL Q1 10/27/2018 Allergies as of 10/31/2017  Review Complete On: 10/31/2017 By: Bhumi Daigle No Known Allergies Current Immunizations  Never Reviewed No immunizations on file. Not reviewed this visit You Were Diagnosed With   
  
 Codes Comments Uncontrolled type 2 diabetes mellitus with diabetic polyneuropathy, with long-term current use of insulin (HCC)    -  Primary ICD-10-CM: E11.42, Z79.4, E11.65 ICD-9-CM: 250.62, 357.2, V58.67 Essential hypertension, benign     ICD-10-CM: I10 
ICD-9-CM: 401.1 Hyperlipidemia LDL goal <70     ICD-10-CM: E78.5 ICD-9-CM: 272.4 Vitals BP Pulse Height(growth percentile) Weight(growth percentile) BMI Smoking Status 118/68 75 6' (1.829 m) 301 lb 3.2 oz (136.6 kg) 40.85 kg/m2 Former Smoker Vitals History BMI and BSA Data Body Mass Index Body Surface Area  
 40.85 kg/m 2 2.63 m 2 [facility]erred Pharmacy [facility] [phone] Your Updated Medication List  
  
   
This list is accurate as of: 10/31/17  4:48 PM.  Always use your most recent med list.  
  
  
 amLODIPine-valsartan 5-160 mg per tablet Commonly known as:  EXFORGE  
TAKE 1 TABLET BY MOUTH EVERY DAY  
  
 aspirin delayed-release 81 mg tablet Take 162 mg by mouth nightly. atenolol 25 mg tablet Commonly known as:  TENORMIN Take 1 Tab by mouth daily. atorvastatin 80 mg tablet Commonly known as:  LIPITOR Take 1 Tab by mouth daily. BD INSULIN PEN NEEDLE UF SHORT 31 gauge x 5/16\" Ndle Generic drug:  Insulin Needles (Disposable) USE AS DIRECTED 3 TIMES DAILY  
  
 cephALEXin 500 mg capsule Commonly known as:  Ashia Revels TAKE 1 CAPSULE TWICE A DAY  
  
 coenzyme Q-10 200 mg capsule Commonly known as:  CO Q-10 Take  by mouth nightly. docusate sodium 100 mg capsule Commonly known as:  Arman Duster Take 100 mg by mouth nightly. FREESTYLE LANCETS 28 gauge Misc Generic drug:  lancets Test 3 times daily Dx: 250.02  
  
 FREESTYLE LITE STRIPS strip Generic drug:  glucose blood VI test strips USE TO TEST THREE TIMES A DAY  
  
 FREESTYLE SYSTEM KIT monitoring kit Generic drug:  Blood-Glucose Meter Test 3 times daily Dx: 250.02  
  
 GLUCOSAMINE PO Take  by mouth as directed. insulin glargine 100 unit/mL (3 mL) Inpn Commonly known as:  LANTUS SOLOSTAR Inject 80 units in the morning and 80 units in the evening as 2 separate injections of 40 units in different locations  
  
 insulin lispro 100 unit/mL kwikpen Commonly known as:  HUMALOG Inject 30 units before dinner  
  
 lansoprazole 30 mg capsule Commonly known as:  PREVACID Take  by mouth Daily (before breakfast). Liraglutide 0.6 mg/0.1 mL (18 mg/3 mL) Pnij Commonly known as:  Alexey Given 3-STEFANIE INJECT 1.8 MG UNDER THE SKIN DAILY  
  
 lisinopril 10 mg tablet Commonly known as:  PRINIVIL, ZESTRIL  
TAKE 1 TABLET DAILY  
  
 metFORMIN 1,000 mg tablet Commonly known as:  GLUCOPHAGE  
TAKE 1 TABLET TWICE A DAY WITH MEALS  
  
 MULTIVITAMIN PO Take  by mouth daily. ZYRTEC PO Take 10 mg by mouth nightly. Follow-up Instructions Return in about 6 months (around 4/30/2018). Patient Instructions 1) Your Hemoglobin A1c is a 3 month marker of your diabetes control. Goal is less than 7% which means your average blood sugar is less than 150. Your Hemoglobin A1c is 7% which means your diabetes is under better control than 8.5% at your last check and is the best it's been in 7 years. Continue to work on your diet and exercise and take all your medications as directed. 2) Your cholesterol is very good. 3) Your ALT and AST are markers of liver function. Your ALT is almost back to normal with weight loss and better A1c. 
 
4) Your BUN and creatinine are markers of kidney function. Your values are normal. 
 
5) Your blood pressure is at goal. 
 
6) If you have more than 2 readings under 80 in a week, plan on decreasing your morning dose of lantus from 76 units to 72 units. 7) I will send you a reminder letter 3-4 weeks prior to your next visit and you will have the order already in the [facility] system so you can just go sometime in the 3-7 days before the next visit to have your labs drawn. Introducing hospitals & HEALTH SERVICES! Dear Nicolás Jeronimo: 
Thank you for requesting a [employer] account. Our records indicate that you already have an active [employer] account. You can access your account anytime at [email or web address] Did you know that you can access your hospital and ER discharge instructions at any time in [employer]? You can also review all of your test results from your hospital stay or ER visit. Additional Information If you have questions, please visit the Frequently Asked Questions section of the [employer] website at [email or web address] Remember, [employer] is NOT to be used for urgent needs. For medical emergencies, dial 911. Now available from your iPhone and Android! Please provide this summary of care documentation to your next provider. Your primary care clinician is listed as Deanna Landry. If you have any questions after today's visit, please call [phone].

## 2017-10-31 NOTE — PROGRESS NOTES
Chief Complaint   Patient presents with    Diabetes     pcp and pharmacy confirmed     History of Present Illness: Aditi Duke is a 78 y.o. male here for follow up of diabetes. Weight down 3 lbs since last visit in 5/17. Has been adjusting his dose of humalog based on his portion and his reading before dinner if under 160 will take about 30-35 units but if over this, will take 40 units. This has kept his fasting sugars under 130 for the most part but can be up to 160 on rare occasion if he eats more carbs late the night before. Has had 2 low readings in the 70s last week in the morning after being up and around but aside from this hasn't had low sugars. Has been taking 38 units of lantus bid and this has worked well and he doesn't have to go to an extra pen. Has also been trying to eat a little better. Compliant with BP and lipid regimen. Current Outpatient Prescriptions   Medication Sig    cephALEXin (KEFLEX) 500 mg capsule TAKE 1 CAPSULE TWICE A DAY    amLODIPine-valsartan (EXFORGE) 5-160 mg per tablet TAKE 1 TABLET BY MOUTH EVERY DAY    lisinopril (PRINIVIL, ZESTRIL) 10 mg tablet TAKE 1 TABLET DAILY    metFORMIN (GLUCOPHAGE) 1,000 mg tablet TAKE 1 TABLET TWICE A DAY WITH MEALS    FREESTYLE LITE STRIPS strip USE TO TEST THREE TIMES A DAY    Liraglutide (VICTOZA 3-STEFANIE) 0.6 mg/0.1 mL (18 mg/3 mL) sub-q pen INJECT 1.8 MG UNDER THE SKIN DAILY    insulin lispro (HUMALOG) 100 unit/mL kwikpen Inject 30 units before dinner    atorvastatin (LIPITOR) 80 mg tablet Take 1 Tab by mouth daily.  atenolol (TENORMIN) 25 mg tablet Take 1 Tab by mouth daily.  insulin glargine (LANTUS SOLOSTAR) 100 unit/mL (3 mL) pen Inject 80 units in the morning and 80 units in the evening as 2 separate injections of 40 units in different locations    docusate sodium (COLACE) 100 mg capsule Take 100 mg by mouth nightly.  lansoprazole (PREVACID) 30 mg capsule Take  by mouth Daily (before breakfast).     FREESTYLE SYSTEM KIT monitoring kit Test 3 times daily Dx: 250.02    FREESTYLE LANCETS 28 gauge misc Test 3 times daily Dx: 250.02    aspirin delayed-release 81 mg tablet Take 162 mg by mouth nightly.  BD INSULIN PEN NEEDLE UF SHORT 31 X 5/16 \" ndle USE AS DIRECTED 3 TIMES DAILY    CETIRIZINE HCL (ZYRTEC PO) Take 10 mg by mouth nightly.  coenzyme q10 200 mg Cap Take  by mouth nightly.  GLUCOSAMINE SULFATE (GLUCOSAMINE PO) Take  by mouth as directed.  MULTIVITAMINS (MULTIVITAMIN PO) Take  by mouth daily. No current facility-administered medications for this visit. No Known Allergies     Review of Systems:  - Eyes: no blurry vision or double vision  - Cardiovascular: no chest pain  - Respiratory: no shortness of breath  - Musculoskeletal: no myalgias  - Neurological: no numbness/tingling in extremities    Physical Examination:  Blood pressure 118/68, pulse 75, height 6' (1.829 m), weight 301 lb 3.2 oz (136.6 kg).   - General: pleasant, no distress, good eye contact   - Neck: no carotid bruits  - Cardiovascular: regular, normal rate, nl s1 and s2, no m/r/g,   - Respiratory: clear bilaterally  - Integumentary: no edema,   - Psychiatric: normal mood and affect         Diabetic foot exam performed by Josie Still MD     Measurement  Response Nurse Comment Physician Comment   Monofilament  R - reduced sensation with micro filament  L - reduced sensation with micro filament     Pulse DP R - 2+ (normal)  L - 2+ (normal)     Vibration R - decreased  L - decreased     Structural deformity R - None  L - None     Skin Integrity / Deformity R - Mild - callus  L - Mild - callus        Reviewed by:                 Data Reviewed: Component      Latest Ref Rng & Units 10/27/2017 10/27/2017 10/27/2017           7:40 AM  7:40 AM  7:40 AM   Glucose      65 - 99 mg/dL  171 (H)    BUN      8 - 27 mg/dL  17    Creatinine      0.76 - 1.27 mg/dL  0.93    GFR est non-AA      >59 mL/min/1.73  85    GFR est AA      >59 mL/min/1.73  99    BUN/Creatinine ratio      10 - 24  18    Sodium      134 - 144 mmol/L  147 (H)    Potassium      3.5 - 5.2 mmol/L  5.1    Chloride      96 - 106 mmol/L  106    CO2      18 - 29 mmol/L  24    Calcium      8.6 - 10.2 mg/dL  10.2    Protein, total      6.0 - 8.5 g/dL  6.9    Albumin      3.6 - 4.8 g/dL  4.3    GLOBULIN, TOTAL      1.5 - 4.5 g/dL  2.6    A-G Ratio      1.2 - 2.2  1.7    Bilirubin, total      0.0 - 1.2 mg/dL  0.4    Alk. phosphatase      39 - 117 IU/L  111    AST      0 - 40 IU/L  40    ALT (SGPT)      0 - 44 IU/L  53 (H)    Cholesterol, total      100 - 199 mg/dL   111   Triglyceride      0 - 149 mg/dL   95   HDL Cholesterol      >39 mg/dL   35 (L)   VLDL, calculated      5 - 40 mg/dL   19   LDL, calculated      0 - 99 mg/dL   57   Hemoglobin A1c, (calculated)      4.8 - 5.6 % 7.0 (H)     Estimated average glucose      mg/dL 154         Assessment/Plan:     1. DM w/o complication type II, uncontrolled (250.02) his most recent Hgb A1c was 7% in 10/17 down from 8.5% in 5/17 up from 8.2% in 11/16 up from 7.7% in 6/16 down from 8.4% in 3/16 up from 7.5% in 10/15 up from 7.4% in 5/15 stable from 12/14 down from 7.6% in 7/14 stable from 3/14 up from 7.5% in 11/13 down from 7.7% in July up from7.1% in March 2013 down from 7.7% in Nov down from 8.3% in August up from 7.7% in April stable from 7.6% in January 2012 down from 8.8% in October 2011 up from 8.4% in July, 7.4% in April stable from 7.5% in January up from 6.7% in October 2010 down from 8.6% in June. His A1c is the best it's been in 7 years so praised him for this.   - cont Lantus 76 units in am and 76 units in pm as 2 separate injections of 38 units   - cont Victoza 1.8 mg daily   - cont Metformin 1000 mg bid  - cont humalog 30 units before dinner + 5 units for every 50 mg/dl above 150 mg/dl  - take humalog 5 units for every 50 mg/dl above 150 mg/dl at breakfast and bedtime  - check bs 3 times daily  - misty VALLES 4/15--will obtain report  - microalbumin nl 12/14, up to 43 in 11/16, down to 10 in 5/17  - foot exam done 10/17  - check A1c and cmp and microalbumin prior to next visit     2. Unspecified essential hypertension (401.9) his BP was at goal < 140/90.    - cont atenolol 25 mg daily  - cont lisinopril 10 mg daily     3. Other and unspecified hyperlipidemia (272.4) Given DM and CAD, Goal LDL < 70, non-HDL < 100, and TG < 150. Lipids at goal in November 2013 and July 2014 and Oct 2015. LDL 79 in 11/16 with higher A1c but down to 57 in 10/17 with lower A1c  - cont Lipitor 80 daily  - check lipids prior to next visit       Patient Instructions   1) Your Hemoglobin A1c is a 3 month marker of your diabetes control. Goal is less than 7% which means your average blood sugar is less than 150. Your Hemoglobin A1c is 7% which means your diabetes is under better control than 8.5% at your last check and is the best it's been in 7 years. Continue to work on your diet and exercise and take all your medications as directed. 2) Your cholesterol is very good. 3) Your ALT and AST are markers of liver function. Your ALT is almost back to normal with weight loss and better A1c.    4) Your BUN and creatinine are markers of kidney function. Your values are normal.    5) Your blood pressure is at goal.    6) If you have more than 2 readings under 80 in a week, plan on decreasing your morning dose of lantus from 76 units to 72 units. 7) I will send you a reminder letter 3-4 weeks prior to your next visit and you will have the order already in the [facility] system so you can just go sometime in the 3-7 days before the next visit to have your labs drawn. Follow-up Disposition:  Return in about 6 months (around 4/30/2018).     Copy sent to:  Dr. Griselda Adnerson [phone]

## 2017-10-31 NOTE — PATIENT INSTRUCTIONS
1) Your Hemoglobin A1c is a 3 month marker of your diabetes control. Goal is less than 7% which means your average blood sugar is less than 150. Your Hemoglobin A1c is 7% which means your diabetes is under better control than 8.5% at your last check and is the best it's been in 7 years. Continue to work on your diet and exercise and take all your medications as directed. 2) Your cholesterol is very good. 3) Your ALT and AST are markers of liver function. Your ALT is almost back to normal with weight loss and better A1c.    4) Your BUN and creatinine are markers of kidney function. Your values are normal.    5) Your blood pressure is at goal.    6) If you have more than 2 readings under 80 in a week, plan on decreasing your morning dose of lantus from 76 units to 72 units. 7) I will send you a reminder letter 3-4 weeks prior to your next visit and you will have the order already in the [facility] system so you can just go sometime in the 3-7 days before the next visit to have your labs drawn.

## 2017-11-13 ENCOUNTER — TELEPHONE (OUTPATIENT)
Dept: ENDOCRINOLOGY | Age: 67
End: 2017-11-13

## 2017-11-13 RX ORDER — INSULIN GLARGINE 100 [IU]/ML
INJECTION, SOLUTION SUBCUTANEOUS
Qty: 150 ML | Refills: 3 | Status: SHIPPED | OUTPATIENT
Start: 2017-11-13 | End: 2018-04-30 | Stop reason: SDUPTHER

## 2017-11-13 RX ORDER — INSULIN LISPRO 100 [IU]/ML
INJECTION, SOLUTION INTRAVENOUS; SUBCUTANEOUS
Qty: 30 ML | Refills: 3 | Status: SHIPPED | OUTPATIENT
Start: 2017-11-13 | End: 2018-09-13 | Stop reason: SDUPTHER

## 2017-11-13 NOTE — TELEPHONE ENCOUNTER
Patient is requesting a refill on his lantus to be sent to [facility]. Patient stated that he is almost out. Patient can be reached at [phone].

## 2017-11-27 RX ORDER — ATENOLOL 25 MG/1
TABLET ORAL
Qty: 90 TAB | Refills: 3 | Status: SHIPPED | OUTPATIENT
Start: 2017-11-27 | End: 2018-11-22 | Stop reason: SDUPTHER

## 2017-11-27 RX ORDER — ATORVASTATIN CALCIUM 80 MG/1
TABLET, FILM COATED ORAL
Qty: 90 TAB | Refills: 3 | Status: SHIPPED | OUTPATIENT
Start: 2017-11-27 | End: 2018-11-22 | Stop reason: SDUPTHER

## 2017-12-19 RX ORDER — LIRAGLUTIDE 6 MG/ML
INJECTION SUBCUTANEOUS
Qty: 27 ML | Refills: 3 | Status: SHIPPED | OUTPATIENT
Start: 2017-12-19 | End: 2018-12-16 | Stop reason: SDUPTHER

## 2018-04-05 DIAGNOSIS — I10 ESSENTIAL HYPERTENSION, BENIGN: ICD-10-CM

## 2018-04-05 DIAGNOSIS — E78.5 HYPERLIPIDEMIA LDL GOAL <70: ICD-10-CM

## 2018-04-26 LAB
ALBUMIN SERPL-MCNC: 4.1 G/DL (ref 3.6–4.8)
ALBUMIN/GLOB SERPL: 1.5 {RATIO} (ref 1.2–2.2)
ALP SERPL-CCNC: 125 IU/L (ref 39–117)
ALT SERPL-CCNC: 58 IU/L (ref 0–44)
AST SERPL-CCNC: 42 IU/L (ref 0–40)
BILIRUB SERPL-MCNC: 0.5 MG/DL (ref 0–1.2)
BUN SERPL-MCNC: 14 MG/DL (ref 8–27)
BUN/CREAT SERPL: 18 (ref 10–24)
CALCIUM SERPL-MCNC: 9.4 MG/DL (ref 8.6–10.2)
CHLORIDE SERPL-SCNC: 105 MMOL/L (ref 96–106)
CHOLEST SERPL-MCNC: 124 MG/DL (ref 100–199)
CO2 SERPL-SCNC: 23 MMOL/L (ref 18–29)
CREAT SERPL-MCNC: 0.8 MG/DL (ref 0.76–1.27)
CREAT UR-MCNC: NORMAL MG/DL
EST. AVERAGE GLUCOSE BLD GHB EST-MCNC: 180 MG/DL
GFR SERPLBLD CREATININE-BSD FMLA CKD-EPI: 108 ML/MIN/1.73
GFR SERPLBLD CREATININE-BSD FMLA CKD-EPI: 93 ML/MIN/1.73
GLOBULIN SER CALC-MCNC: 2.7 G/DL (ref 1.5–4.5)
GLUCOSE SERPL-MCNC: 179 MG/DL (ref 65–99)
HBA1C MFR BLD: 7.9 % (ref 4.8–5.6)
HDLC SERPL-MCNC: 38 MG/DL
INTERPRETATION, 910389: NORMAL
LDLC SERPL CALC-MCNC: 69 MG/DL (ref 0–99)
Lab: NORMAL
MICROALBUMIN UR-MCNC: NORMAL
POTASSIUM SERPL-SCNC: 4.9 MMOL/L (ref 3.5–5.2)
PROT SERPL-MCNC: 6.8 G/DL (ref 6–8.5)
SODIUM SERPL-SCNC: 143 MMOL/L (ref 134–144)
TRIGL SERPL-MCNC: 86 MG/DL (ref 0–149)
VLDLC SERPL CALC-MCNC: 17 MG/DL (ref 5–40)

## 2018-04-30 ENCOUNTER — OFFICE VISIT (OUTPATIENT)
Dept: ENDOCRINOLOGY | Age: 68
End: 2018-04-30

## 2018-04-30 VITALS
HEIGHT: 72 IN | SYSTOLIC BLOOD PRESSURE: 134 MMHG | BODY MASS INDEX: 41.49 KG/M2 | WEIGHT: 306.3 LBS | DIASTOLIC BLOOD PRESSURE: 80 MMHG | HEART RATE: 82 BPM

## 2018-04-30 DIAGNOSIS — E78.5 HYPERLIPIDEMIA LDL GOAL <70: ICD-10-CM

## 2018-04-30 DIAGNOSIS — I10 ESSENTIAL HYPERTENSION, BENIGN: ICD-10-CM

## 2018-04-30 PROBLEM — E66.01 OBESITY, MORBID (HCC): Status: ACTIVE | Noted: 2018-04-30

## 2018-04-30 RX ORDER — INSULIN GLARGINE 100 [IU]/ML
INJECTION, SOLUTION SUBCUTANEOUS
Qty: 150 ML | Refills: 3
Start: 2018-04-30 | End: 2018-10-29

## 2018-04-30 NOTE — MR AVS SNAPSHOT
[address] [id number] [phone] Patient: Salbador Grace MRN: [id number] Visit Information Date & Time Provider Department Dept. Phone Encounter #  
 4/30/2018  4:10 PM Sharonda Acharya, [address] [place] [address] Diabetes and Endocrinology [phone] [id number] Follow-up Instructions Return in about 6 months (around 10/30/2018). Upcoming Health Maintenance Date Due Hepatitis C Screening 1950 DTaP/Tdap/Td series (1 - Tdap) 8/30/1972 FOBT Q 1 YEAR AGE 50-75 8/30/2001 ZOSTER VACCINE AGE 60> 6/30/2011 EYE EXAM RETINAL OR DILATED Q1 4/1/2016 GLAUCOMA SCREENING Q2Y 8/30/2016 Pneumococcal 65+ Low/Medium Risk (1 of 2 - PCV13) 8/30/2016 MEDICARE YEARLY EXAM 3/27/2018 Influenza Age 5 to Adult 8/1/2018 HEMOGLOBIN A1C Q6M 10/25/2018 FOOT EXAM Q1 10/31/2018 MICROALBUMIN Q1 4/25/2019 LIPID PANEL Q1 4/25/2019 Allergies as of 4/30/2018  Review Complete On: 4/30/2018 By: Sharonda Acharya MD  
 No Known Allergies Current Immunizations  Never Reviewed No immunizations on file. Not reviewed this visit You Were Diagnosed With   
  
 Codes Comments Uncontrolled type 2 diabetes mellitus with diabetic polyneuropathy, with long-term current use of insulin (HCC)    -  Primary ICD-10-CM: E11.42, Z79.4, E11.65 ICD-9-CM: 250.62, 357.2, V58.67 Essential hypertension, benign     ICD-10-CM: I10 
ICD-9-CM: 401.1 Hyperlipidemia LDL goal <70     ICD-10-CM: E78.5 ICD-9-CM: 272.4 Vitals BP Pulse Height(growth percentile) Weight(growth percentile) BMI Smoking Status 134/80 82 6' (1.829 m) 306 lb 4.8 oz (138.9 kg) 41.54 kg/m2 Former Smoker Vitals History BMI and BSA Data Body Mass Index Body Surface Area 41.54 kg/m 2 2.66 m 2 Preferred Pharmacy Pharmacy Name [facility] [phone] Your Updated Medication List  
  
   
This list is accurate as of 4/30/18  5:04 PM.  Always use your most recent med list. amLODIPine-valsartan 5-160 mg per tablet Commonly known as:  EXFORGE  
TAKE 1 TABLET BY MOUTH EVERY DAY  
  
 aspirin delayed-release 81 mg tablet Take 162 mg by mouth nightly. atenolol 25 mg tablet Commonly known as:  TENORMIN  
TAKE 1 TABLET DAILY  
  
 atorvastatin 80 mg tablet Commonly known as:  LIPITOR  
TAKE 1 TABLET DAILY  
  
 BD ULTRA-FINE SHORT PEN NEEDLE 31 gauge x 5/16\" Ndle Generic drug:  Insulin Needles (Disposable) USE AS DIRECTED 3 TIMES DAILY  
  
 cephALEXin 500 mg capsule Commonly known as:  Junius Alpers TAKE 1 CAPSULE TWICE A DAY  
  
 coenzyme Q-10 200 mg capsule Commonly known as:  CO Q-10 Take  by mouth nightly. docusate sodium 100 mg capsule Commonly known as:  Gwendel Laser Take 100 mg by mouth nightly. FREESTYLE LANCETS 28 gauge Misc Generic drug:  lancets Test 3 times daily Dx: 250.02  
  
 FREESTYLE LITE STRIPS strip Generic drug:  glucose blood VI test strips USE TO TEST THREE TIMES A DAY  
  
 FREESTYLE SYSTEM KIT monitoring kit Generic drug:  Blood-Glucose Meter Test 3 times daily Dx: 250.02  
  
 GLUCOSAMINE PO Take  by mouth as directed. insulin glargine 100 unit/mL (3 mL) Inpn Commonly known as:  LANTUS SOLOSTAR U-100 INSULIN Inject 76 units in the morning and 76 units in the evening as 2 separate injections of 38 units in different locations--Dose change 4/30/18--updated med list--did not send prescription to the pharmacy  
  
 insulin lispro 100 unit/mL kwikpen Commonly known as:  HumaLOG KwikPen Insulin INJECT 30-40 UNITS BEFORE DINNER  
  
 lansoprazole 30 mg capsule Commonly known as:  PREVACID Take  by mouth Daily (before breakfast). lisinopril 10 mg tablet Commonly known as:  PRINIVIL, ZESTRIL  
TAKE 1 TABLET DAILY  
  
 metFORMIN 1,000 mg tablet Commonly known as:  GLUCOPHAGE  
TAKE 1 TABLET TWICE A DAY WITH MEALS  
  
 MULTIVITAMIN PO Take  by mouth daily. VICTOZA 3-STEFANIE 0.6 mg/0.1 mL (18 mg/3 mL) Pnij Generic drug:  Liraglutide INJECT 1.8 MG UNDER THE SKIN DAILY  
  
 ZYRTEC PO Take 10 mg by mouth nightly. We Performed the Following MICROALBUMIN, UR, RAND W/ MICROALB/CREAT RATIO [id number] CPT(R)] Follow-up Instructions Return in about 6 months (around 10/30/2018). Patient Instructions 1) Your Hemoglobin A1c is a 3 month marker of your diabetes control. Goal is less than 7% which means your average blood sugar is less than 150. Your Hemoglobin A1c is 7.9% which means your diabetes is under slightly worse control than 7% at your last check. Continue to work on your diet and exercise and take all your medications as directed. 2) I won't make any changes to your insulin. Do your best to watch your portions at night and try to stick to 30-35 units rather than 40 units of humalog and help keep your weight down. 3) Try to do some walking at night to keep your sugar down in the morning. 4) Your cholesterol is still at goal but slightly higher with 5 lb wt gain. 5) Your ALT and AST are markers of liver function. Your values are slightly higher with wt gain. Introducing [facility]! Dear Starla Ruiz: 
Thank you for requesting a [facility] account. Our records indicate that you already have an active [facility] account. You can access your account anytime at [email or web address] Did you know that you can access your hospital and ER discharge instructions at any time in [facility]? You can also review all of your test results from your hospital stay or ER visit. Additional Information If you have questions, please visit the Frequently Asked Questions section of the [employer] website at [email or web address] Remember, [employer] is NOT to be used for urgent needs. For medical emergencies, dial 911. Now available from your iPhone and Android! Please provide this summary of care documentation to your next provider. Your primary care clinician is listed as Dedra Diaz. If you have any questions after today's visit, please call [phone].

## 2018-04-30 NOTE — PROGRESS NOTES
Chief Complaint   Patient presents with    Diabetes     pcp and pharmacy confirmed    Other     eye exam is due     History of Present Illness: Blair Barrios is a 78 y.o. male here for follow up of diabetes. Weight up 5 lbs since last visit in 10/17. Still taking lantus as below. Tends to take closer to 40 units for most dinners and can have his fasting sugar in the 110-120s but other times up to the 160s. Trying to do some walking after dinner but not always. Hopes to go to [place] this summer. Compliant with meds below. Has had to take some humalog at breakfast on occasion for sugars over 150 and rarely over 200. Current Outpatient Prescriptions   Medication Sig    insulin glargine (LANTUS SOLOSTAR U-100 INSULIN) 100 unit/mL (3 mL) inpn Inject 76 units in the morning and 76 units in the evening as 2 separate injections of 38 units in different locations--Dose change 4/30/18--updated med list--did not send prescription to the pharmacy    VICTOZA 3-STEFANIE 0.6 mg/0.1 mL (18 mg/3 mL) pnij INJECT 1.8 MG UNDER THE SKIN DAILY    atorvastatin (LIPITOR) 80 mg tablet TAKE 1 TABLET DAILY    atenolol (TENORMIN) 25 mg tablet TAKE 1 TABLET DAILY    insulin lispro (HUMALOG KWIKPEN) 100 unit/mL kwikpen INJECT 30-40 UNITS BEFORE DINNER    cephALEXin (KEFLEX) 500 mg capsule TAKE 1 CAPSULE TWICE A DAY    amLODIPine-valsartan (EXFORGE) 5-160 mg per tablet TAKE 1 TABLET BY MOUTH EVERY DAY    lisinopril (PRINIVIL, ZESTRIL) 10 mg tablet TAKE 1 TABLET DAILY    metFORMIN (GLUCOPHAGE) 1,000 mg tablet TAKE 1 TABLET TWICE A DAY WITH MEALS    FREESTYLE LITE STRIPS strip USE TO TEST THREE TIMES A DAY    docusate sodium (COLACE) 100 mg capsule Take 100 mg by mouth nightly.  lansoprazole (PREVACID) 30 mg capsule Take  by mouth Daily (before breakfast).     FREESTYLE SYSTEM KIT monitoring kit Test 3 times daily Dx: 250.02    FREESTYLE LANCETS 28 gauge misc Test 3 times daily Dx: 250.02    aspirin delayed-release 81 mg tablet Take 162 mg by mouth nightly.  BD INSULIN PEN NEEDLE UF SHORT 31 X 5/16 \" ndle USE AS DIRECTED 3 TIMES DAILY    CETIRIZINE HCL (ZYRTEC PO) Take 10 mg by mouth nightly.  coenzyme q10 200 mg Cap Take  by mouth nightly.  GLUCOSAMINE SULFATE (GLUCOSAMINE PO) Take  by mouth as directed.  MULTIVITAMINS (MULTIVITAMIN PO) Take  by mouth daily. No current facility-administered medications for this visit. No Known Allergies     Review of Systems:  - Eyes: no blurry vision or double vision  - Cardiovascular: no chest pain  - Respiratory: no shortness of breath  - Musculoskeletal: no myalgias  - Neurological: no numbness/tingling in extremities    Physical Examination:  Blood pressure 134/80, pulse 82, height 6' (1.829 m), weight 306 lb 4.8 oz (138.9 kg). - General: pleasant, no distress, good eye contact   - Neck: no carotid bruits  - Cardiovascular: regular, normal rate, nl s1 and s2, no m/r/g,   - Respiratory: clear bilaterally  - Integumentary: no edema,   - Psychiatric: normal mood and affect    Data Reviewed:   Component      Latest Ref Rng & Units 4/25/2018 4/25/2018 4/25/2018           7:55 AM  7:55 AM  7:55 AM   Glucose      65 - 99 mg/dL   179 (H)   BUN      8 - 27 mg/dL   14   Creatinine      0.76 - 1.27 mg/dL   0.80   GFR est non-AA      >59 mL/min/1.73   93   GFR est AA      >59 mL/min/1.73   108   BUN/Creatinine ratio      10 - 24   18   Sodium      134 - 144 mmol/L   143   Potassium      3.5 - 5.2 mmol/L   4.9   Chloride      96 - 106 mmol/L   105   CO2      18 - 29 mmol/L   23   Calcium      8.6 - 10.2 mg/dL   9.4   Protein, total      6.0 - 8.5 g/dL   6.8   Albumin      3.6 - 4.8 g/dL   4.1   GLOBULIN, TOTAL      1.5 - 4.5 g/dL   2.7   A-G Ratio      1.2 - 2.2   1.5   Bilirubin, total      0.0 - 1.2 mg/dL   0.5   Alk.  phosphatase      39 - 117 IU/L   125 (H)   AST      0 - 40 IU/L   42 (H)   ALT (SGPT)      0 - 44 IU/L   58 (H)   Cholesterol, total      100 - 199 mg/dL  124 Triglyceride      0 - 149 mg/dL  86    HDL Cholesterol      >39 mg/dL  38 (L)    VLDL, calculated      5 - 40 mg/dL  17    LDL, calculated      0 - 99 mg/dL  69    Hemoglobin A1c, (calculated)      4.8 - 5.6 % 7.9 (H)     Estimated average glucose      mg/dL 180         Assessment/Plan:     1. DM w/o complication type II, uncontrolled (250.02) his most recent Hgb A1c was 7.9% in 4/18 up from 7% in 10/17 down from 8.5% in 5/17 up from 8.2% in 11/16 up from 7.7% in 6/16 down from 8.4% in 3/16 up from 7.5% in 10/15 up from 7.4% in 5/15 stable from 12/14 down from 7.6% in 7/14 stable from 3/14 up from 7.5% in 11/13 down from 7.7% in July up from7.1% in March 2013 down from 7.7% in Nov down from 8.3% in August up from 7.7% in April stable from 7.6% in January 2012 down from 8.8% in October 2011 up from 8.4% in July, 7.4% in April stable from 7.5% in January up from 6.7% in October 2010 down from 8.6% in June. His A1c is higher due to wt gain and diet so will keep everything the same. - cont Lantus 76 units in am and 76 units in pm as 2 separate injections of 38 units   - cont Victoza 1.8 mg daily   - cont Metformin 1000 mg bid  - cont humalog 30 units before dinner + 5 units for every 50 mg/dl above 150 mg/dl  - take humalog 5 units for every 50 mg/dl above 150 mg/dl at breakfast and bedtime  - check bs 3 times daily  - optho UTD 4/15--going next week  - microalbumin nl 12/14, up to 43 in 11/16, down to 10 in 5/17--repeat today  - foot exam done 10/17  - check A1c and cmp prior to next visit     2. Unspecified essential hypertension (401.9) his BP was at goal < 140/90.    - cont atenolol 25 mg daily  - cont lisinopril 10 mg daily     3. Other and unspecified hyperlipidemia (272.4) Given DM and CAD, Goal LDL < 70, non-HDL < 100, and TG < 150. Lipids at goal in November 2013 and July 2014 and Oct 2015. LDL 79 in 11/16 with higher A1c but down to 57 in 10/17 with lower A1c.   LDL 69 in 4/18.  - cont Lipitor 80 daily  - check lipids prior to next visit       Patient Instructions   1) Your Hemoglobin A1c is a 3 month marker of your diabetes control. Goal is less than 7% which means your average blood sugar is less than 150. Your Hemoglobin A1c is 7.9% which means your diabetes is under slightly worse control than 7% at your last check. Continue to work on your diet and exercise and take all your medications as directed. 2) I won't make any changes to your insulin. Do your best to watch your portions at night and try to stick to 30-35 units rather than 40 units of humalog and help keep your weight down. 3) Try to do some walking at night to keep your sugar down in the morning. 4) Your cholesterol is still at goal but slightly higher with 5 lb wt gain. 5) Your ALT and AST are markers of liver function. Your values are slightly higher with wt gain. Follow-up Disposition:  Return in about 6 months (around 10/30/2018). Copy sent to:  Dr. Cristofer Potter [phone]    Lab follow up: 5/1/18    Sent him the following message in a letter:    Resulted Orders   MICROALBUMIN, UR, RAND W/ MICROALB/CREAT RATIO   Result Value Ref Range    Creatinine, urine 171.0 Not Estab. mg/dL    Microalbumin, urine 22.3 Not Estab. ug/mL    Microalb/Creat ratio (ug/mg creat.) 13.0 0.0 - 30.0 mg/g creat    Narrative    Performed at:  [address]  [id number]  : Amanda Valente MD, Phone:  [phone]       Microalbumin/creatinine ratio is a marker of the amount of protein in your urine. Goal is less than 30. Your value is normal. This indicates that your kidneys are not being affected by your uncontrolled diabetes and/or blood pressure. Continue to take lisinopril to help protect your kidneys from the effects of diabetes and high blood pressure.

## 2018-04-30 NOTE — PATIENT INSTRUCTIONS
1) Your Hemoglobin A1c is a 3 month marker of your diabetes control. Goal is less than 7% which means your average blood sugar is less than 150. Your Hemoglobin A1c is 7.9% which means your diabetes is under slightly worse control than 7% at your last check. Continue to work on your diet and exercise and take all your medications as directed. 2) I won't make any changes to your insulin. Do your best to watch your portions at night and try to stick to 30-35 units rather than 40 units of humalog and help keep your weight down. 3) Try to do some walking at night to keep your sugar down in the morning. 4) Your cholesterol is still at goal but slightly higher with 5 lb wt gain. 5) Your ALT and AST are markers of liver function. Your values are slightly higher with wt gain.

## 2018-05-01 LAB
ALBUMIN/CREAT UR: 13 MG/G CREAT (ref 0–30)
CREAT UR-MCNC: 171 MG/DL
MICROALBUMIN UR-MCNC: 22.3 UG/ML

## 2018-05-20 RX ORDER — METFORMIN HYDROCHLORIDE 1000 MG/1
TABLET ORAL
Qty: 180 TAB | Refills: 3 | Status: SHIPPED | OUTPATIENT
Start: 2018-05-20 | End: 2019-05-15 | Stop reason: SDUPTHER

## 2018-05-31 RX ORDER — BLOOD-GLUCOSE METER
KIT MISCELLANEOUS
Qty: 300 STRIP | Refills: 3 | Status: SHIPPED | OUTPATIENT
Start: 2018-05-31 | End: 2018-10-29 | Stop reason: SDUPTHER

## 2018-05-31 RX ORDER — PEN NEEDLE, DIABETIC 31 GX5/16"
NEEDLE, DISPOSABLE MISCELLANEOUS
Qty: 300 PACKAGE | Refills: 3 | Status: SHIPPED | OUTPATIENT
Start: 2018-05-31 | End: 2019-05-15 | Stop reason: SDUPTHER

## 2018-05-31 RX ORDER — PEN NEEDLE, DIABETIC 31 GX5/16"
NEEDLE, DISPOSABLE MISCELLANEOUS
Qty: 300 PACKAGE | Refills: 3 | Status: CANCELLED | OUTPATIENT
Start: 2018-05-31

## 2018-05-31 NOTE — TELEPHONE ENCOUNTER
----- Message from Saran Carl sent at 5/31/2018 11:30 AM EDT -----  Regarding: Dr. Brianna Smart  Mrs. Olivia Greene, pt's wife, requesting for an order for test strips and needles (short HMM 31 gauge) for Free style light meteor to be sent to [facility] in pt's file. The needles can still be called into [facility] pharmacy however the test strips are needed now as pt is out.   Best contact number [phone]

## 2018-08-02 RX ORDER — LISINOPRIL 10 MG/1
TABLET ORAL
Qty: 90 TAB | Refills: 3 | Status: ON HOLD | OUTPATIENT
Start: 2018-08-02 | End: 2019-03-01

## 2018-09-13 RX ORDER — INSULIN LISPRO 100 [IU]/ML
INJECTION, SOLUTION INTRAVENOUS; SUBCUTANEOUS
Qty: 30 ML | Refills: 3 | Status: SHIPPED | OUTPATIENT
Start: 2018-09-13 | End: 2019-07-26 | Stop reason: SDUPTHER

## 2018-10-25 LAB
ALBUMIN SERPL-MCNC: 4.2 G/DL (ref 3.6–4.8)
ALBUMIN/GLOB SERPL: 1.6 {RATIO} (ref 1.2–2.2)
ALP SERPL-CCNC: 115 IU/L (ref 39–117)
ALT SERPL-CCNC: 57 IU/L (ref 0–44)
AST SERPL-CCNC: 53 IU/L (ref 0–40)
BILIRUB SERPL-MCNC: 0.4 MG/DL (ref 0–1.2)
BUN SERPL-MCNC: 18 MG/DL (ref 8–27)
BUN/CREAT SERPL: 18 (ref 10–24)
CALCIUM SERPL-MCNC: 9.8 MG/DL (ref 8.6–10.2)
CHLORIDE SERPL-SCNC: 106 MMOL/L (ref 96–106)
CHOLEST SERPL-MCNC: 104 MG/DL (ref 100–199)
CO2 SERPL-SCNC: 23 MMOL/L (ref 20–29)
CREAT SERPL-MCNC: 0.99 MG/DL (ref 0.76–1.27)
EST. AVERAGE GLUCOSE BLD GHB EST-MCNC: 163 MG/DL
GLOBULIN SER CALC-MCNC: 2.6 G/DL (ref 1.5–4.5)
GLUCOSE SERPL-MCNC: 119 MG/DL (ref 65–99)
HBA1C MFR BLD: 7.3 % (ref 4.8–5.6)
HDLC SERPL-MCNC: 35 MG/DL
INTERPRETATION, 910389: NORMAL
LDLC SERPL CALC-MCNC: 54 MG/DL (ref 0–99)
Lab: NORMAL
POTASSIUM SERPL-SCNC: 5.1 MMOL/L (ref 3.5–5.2)
PROT SERPL-MCNC: 6.8 G/DL (ref 6–8.5)
SODIUM SERPL-SCNC: 145 MMOL/L (ref 134–144)
TRIGL SERPL-MCNC: 73 MG/DL (ref 0–149)
VLDLC SERPL CALC-MCNC: 15 MG/DL (ref 5–40)

## 2018-10-29 ENCOUNTER — OFFICE VISIT (OUTPATIENT)
Dept: ENDOCRINOLOGY | Age: 68
End: 2018-10-29

## 2018-10-29 VITALS
DIASTOLIC BLOOD PRESSURE: 71 MMHG | SYSTOLIC BLOOD PRESSURE: 135 MMHG | BODY MASS INDEX: 40.66 KG/M2 | HEART RATE: 76 BPM | HEIGHT: 72 IN | WEIGHT: 300.2 LBS

## 2018-10-29 DIAGNOSIS — E78.5 HYPERLIPIDEMIA LDL GOAL <70: ICD-10-CM

## 2018-10-29 DIAGNOSIS — I10 ESSENTIAL HYPERTENSION, BENIGN: ICD-10-CM

## 2018-10-29 RX ORDER — BLOOD-GLUCOSE METER
KIT MISCELLANEOUS
Qty: 300 STRIP | Refills: 3 | Status: SHIPPED | OUTPATIENT
Start: 2018-10-29 | End: 2019-11-04 | Stop reason: SDUPTHER

## 2018-10-29 RX ORDER — INSULIN GLARGINE 100 [IU]/ML
INJECTION, SOLUTION SUBCUTANEOUS
Qty: 150 ML | Refills: 3
Start: 2018-10-29 | End: 2018-12-14 | Stop reason: SDUPTHER

## 2018-10-29 NOTE — PROGRESS NOTES
Chief Complaint   Patient presents with    Diabetes     pcp and pharmacy confirmed    Other     consent fom signed to obtain eye report     History of Present Illness: Wilbert Low is a 80 y.o. male here for follow up of diabetes. Weight down 6 lbs since last visit in 4/18. Has been watching his diet more closely and walking more to help get his weight down as his back hasn't been hurting as much since using a wedge in the right shoe to offset slight leg length difference. Has been taking 35 units of humalog at night as his sugars were dropping low in the morning under 80 with taking 40 units and has been watching his snacking after dinner more closely. Despite this is still getting readings in the 70s fasting quite often but other times is . Not usually checking at other times of day. Compliant with rest of the meds below. Current Outpatient Medications   Medication Sig    HUMALOG KWIKPEN INSULIN 100 unit/mL kwikpen INJECT 30 TO 40 UNITS BEFORE DINNER    lisinopril (PRINIVIL, ZESTRIL) 10 mg tablet TAKE 1 TABLET DAILY    FREESTYLE LITE STRIPS strip USE TO TEST THREE TIMES A DAY    BD ULTRA-FINE SHORT PEN NEEDLE 31 gauge x 5/16\" ndle USE AS DIRECTED 3 TIMES DAILY    metFORMIN (GLUCOPHAGE) 1,000 mg tablet TAKE 1 TABLET TWICE A DAY WITH MEALS    insulin glargine (LANTUS SOLOSTAR U-100 INSULIN) 100 unit/mL (3 mL) inpn Inject 76 units in the morning and 76 units in the evening as 2 separate injections of 38 units in different locations--Dose change 4/30/18--updated med list--did not send prescription to the pharmacy    VICTOZA 3-STEFANIE 0.6 mg/0.1 mL (18 mg/3 mL) pnij INJECT 1.8 MG UNDER THE SKIN DAILY    atorvastatin (LIPITOR) 80 mg tablet TAKE 1 TABLET DAILY    atenolol (TENORMIN) 25 mg tablet TAKE 1 TABLET DAILY    amLODIPine-valsartan (EXFORGE) 5-160 mg per tablet TAKE 1 TABLET BY MOUTH EVERY DAY    docusate sodium (COLACE) 100 mg capsule Take 100 mg by mouth nightly.    100 Elsie Peña KIT monitoring kit Test 3 times daily Dx: 250.02    FREESTYLE LANCETS 28 gauge misc Test 3 times daily Dx: 250.02    aspirin delayed-release 81 mg tablet Take 162 mg by mouth nightly.  CETIRIZINE HCL (ZYRTEC PO) Take 10 mg by mouth nightly.  coenzyme q10 200 mg Cap Take  by mouth nightly.  GLUCOSAMINE SULFATE (GLUCOSAMINE PO) Take  by mouth as directed.  MULTIVITAMINS (MULTIVITAMIN PO) Take  by mouth daily.  lansoprazole (PREVACID) 30 mg capsule Take  by mouth Daily (before breakfast). No current facility-administered medications for this visit. No Known Allergies     Review of Systems:  - Eyes: no blurry vision or double vision  - Cardiovascular: no chest pain  - Respiratory: no shortness of breath  - Musculoskeletal: no myalgias  - Neurological: no numbness/tingling in extremities    Physical Examination:  Blood pressure 135/71, pulse 76, height 6' (1.829 m), weight 300 lb 3.2 oz (136.2 kg).   - General: pleasant, no distress, good eye contact   - Neck: no carotid bruits  - Cardiovascular: regular, normal rate, nl s1 and s2, no m/r/g,   - Respiratory: clear bilaterally  - Integumentary: no edema,   - Psychiatric: normal mood and affect    Diabetic foot exam:     Left Foot:   Visual Exam: callous - mild   Pulse DP: 2+ (normal)   Filament test: reduced sensation    Vibratory sensation: diminished      Right Foot:   Visual Exam: callous - mild   Pulse DP: 2+ (normal)   Filament test: reduced sensation    Vibratory sensation: diminished        Data Reviewed:   Component      Latest Ref Rng & Units 10/24/2018 10/24/2018 10/24/2018           8:42 AM  8:42 AM  8:42 AM   Glucose      65 - 99 mg/dL  119 (H)    BUN      8 - 27 mg/dL  18    Creatinine      0.76 - 1.27 mg/dL  0.99    GFR est non-AA      >59 mL/min/1.73  78    GFR est AA      >59 mL/min/1.73  91    BUN/Creatinine ratio      10 - 24  18    Sodium      134 - 144 mmol/L  145 (H)    Potassium      3.5 - 5.2 mmol/L  5.1    Chloride      96 - 106 mmol/L  106    CO2      20 - 29 mmol/L  23    Calcium      8.6 - 10.2 mg/dL  9.8    Protein, total      6.0 - 8.5 g/dL  6.8    Albumin      3.6 - 4.8 g/dL  4.2    GLOBULIN, TOTAL      1.5 - 4.5 g/dL  2.6    A-G Ratio      1.2 - 2.2  1.6    Bilirubin, total      0.0 - 1.2 mg/dL  0.4    Alk. phosphatase      39 - 117 IU/L  115    AST      0 - 40 IU/L  53 (H)    ALT (SGPT)      0 - 44 IU/L  57 (H)    Cholesterol, total      100 - 199 mg/dL 104     Triglyceride      0 - 149 mg/dL 73     HDL Cholesterol      >39 mg/dL 35 (L)     VLDL, calculated      5 - 40 mg/dL 15     LDL, calculated      0 - 99 mg/dL 54     Hemoglobin A1c, (calculated)      4.8 - 5.6 %   7.3 (H)   Estimated average glucose      mg/dL   163       Assessment/Plan:     1. DM w/o complication type II, uncontrolled (250.02) his most recent Hgb A1c was 7.3% in 10/18 down from 7.9% in 4/18 up from 7% in 10/17 down from 8.5% in 5/17 up from 8.2% in 11/16 up from 7.7% in 6/16 down from 8.4% in 3/16 up from 7.5% in 10/15 up from 7.4% in 5/15 stable from 12/14 down from 7.6% in 7/14 stable from 3/14 up from 7.5% in 11/13 down from 7.7% in July up from7.1% in March 2013 down from 7.7% in Nov down from 8.3% in August up from 7.7% in April stable from 7.6% in January 2012 down from 8.8% in October 2011 up from 8.4% in July, 7.4% in April stable from 7.5% in January up from 6.7% in October 2010 down from 8.6% in June. His A1c is coming down with weight loss. Having some fasting hypoglycemia so will decrease evening lantus.   - cont Lantus 76 units in am and decrease to 72 units in pm as 2 separate injections    - cont Victoza 1.8 mg daily   - cont Metformin 1000 mg bid  - cont humalog 35 units before dinner + 5 units for every 50 mg/dl above 150 mg/dl  - take humalog 5 units for every 50 mg/dl above 150 mg/dl at breakfast and bedtime  - check bs 3 times daily  - optho UTD 4/18--will obtain report  - microalbumin nl 12/14, up to 43 in 11/16, down to 10 in 5/17 and 13 in 4/18  - foot exam done 10/18  - check A1c and bmp and microalbumin prior to next visit     2. Unspecified essential hypertension (401.9) his BP was at goal < 140/90.    - cont atenolol 25 mg daily  - cont lisinopril 10 mg daily     3. Other and unspecified hyperlipidemia (272.4) Given DM and CAD, Goal LDL < 70, non-HDL < 100, and TG < 150. Lipids at goal in November 2013 and July 2014 and Oct 2015. LDL 79 in 11/16 with higher A1c but down to 57 in 10/17 with lower A1c. LDL 69 in 4/18 and 54 in 10/18  - cont Lipitor 80 daily  - check lipids in 10/19       Patient Instructions   1) Cut back on the evening dose of lantus to 72 units (36 units back to back) and stay on 76 units in the morning. 2) If you are watching your portions at night more carefully and still getting reading under 80 in the morning, then try 30 units of humalog at night and if still going under 80, then cut back further on the lantus to 68 units. 3) Your blood pressure is at goal and kidney function is normal and cholesterol is better. 4) Your ALT and AST are markers of liver function. Your values are likely elevated due to fatty deposition in the liver that can occur with weight gain. Hopefully with weight loss, these values will return to normal.      Follow-up Disposition:  Return in about 6 months (around 4/29/2019).     Copy sent to:  Dr. Radha Gamez [phone]

## 2018-10-29 NOTE — PATIENT INSTRUCTIONS
1) Cut back on the evening dose of lantus to 72 units (36 units back to back) and stay on 76 units in the morning. 2) If you are watching your portions at night more carefully and still getting reading under 80 in the morning, then try 30 units of humalog at night and if still going under 80, then cut back further on the lantus to 68 units. 3) Your blood pressure is at goal and kidney function is normal and cholesterol is better. 4) Your ALT and AST are markers of liver function. Your values are likely elevated due to fatty deposition in the liver that can occur with weight gain.   Hopefully with weight loss, these values will return to normal.

## 2018-11-23 RX ORDER — ATORVASTATIN CALCIUM 80 MG/1
TABLET, FILM COATED ORAL
Qty: 90 TAB | Refills: 3 | Status: SHIPPED | OUTPATIENT
Start: 2018-11-23 | End: 2019-10-26 | Stop reason: SDUPTHER

## 2018-11-23 RX ORDER — ATENOLOL 25 MG/1
TABLET ORAL
Qty: 90 TAB | Refills: 3 | Status: SHIPPED | OUTPATIENT
Start: 2018-11-23 | End: 2019-03-01

## 2018-12-14 RX ORDER — INSULIN GLARGINE 100 [IU]/ML
INJECTION, SOLUTION SUBCUTANEOUS
Qty: 150 ML | Refills: 3 | Status: SHIPPED | OUTPATIENT
Start: 2018-12-14 | End: 2019-04-29

## 2018-12-17 RX ORDER — LIRAGLUTIDE 6 MG/ML
INJECTION SUBCUTANEOUS
Qty: 27 ML | Refills: 3 | Status: SHIPPED | OUTPATIENT
Start: 2018-12-17 | End: 2020-01-31 | Stop reason: SDUPTHER

## 2019-02-26 ENCOUNTER — HOSPITAL ENCOUNTER (OUTPATIENT)
Dept: GENERAL RADIOLOGY | Age: 69
Discharge: HOME | End: 2019-02-26
Payer: COMMERCIAL

## 2019-02-26 DIAGNOSIS — I25.10 DISEASE OF CARDIOVASCULAR SYSTEM: ICD-10-CM

## 2019-02-26 PROCEDURE — 71046 X-RAY EXAM CHEST 2 VIEWS: CPT

## 2019-03-01 ENCOUNTER — HOSPITAL ENCOUNTER (OUTPATIENT)
Age: 69
Discharge: HOME | End: 2019-03-01
Attending: INTERNAL MEDICINE | Admitting: INTERNAL MEDICINE
Payer: COMMERCIAL

## 2019-03-01 VITALS
SYSTOLIC BLOOD PRESSURE: 130 MMHG | HEART RATE: 65 BPM | OXYGEN SATURATION: 98 % | HEIGHT: 72 IN | DIASTOLIC BLOOD PRESSURE: 74 MMHG | WEIGHT: 295 LBS | RESPIRATION RATE: 14 BRPM | TEMPERATURE: 97.6 F | BODY MASS INDEX: 39.96 KG/M2

## 2019-03-01 DIAGNOSIS — R07.9 CHEST PAIN, UNSPECIFIED TYPE: ICD-10-CM

## 2019-03-01 PROBLEM — I25.10 CAD (CORONARY ARTERY DISEASE): Status: ACTIVE | Noted: 2019-03-01

## 2019-03-01 LAB
GLUCOSE BLD STRIP.AUTO-MCNC: 103 MG/DL (ref 65–100)
GLUCOSE BLD STRIP.AUTO-MCNC: 146 MG/DL (ref 65–100)
GLUCOSE BLD STRIP.AUTO-MCNC: 78 MG/DL (ref 65–100)
GLUCOSE BLD STRIP.AUTO-MCNC: 78 MG/DL (ref 65–100)
GLUCOSE BLD STRIP.AUTO-MCNC: 84 MG/DL (ref 65–100)
SERVICE CMNT-IMP: ABNORMAL
SERVICE CMNT-IMP: ABNORMAL
SERVICE CMNT-IMP: NORMAL

## 2019-03-01 PROCEDURE — 99153 MOD SED SAME PHYS/QHP EA: CPT | Performed by: INTERNAL MEDICINE

## 2019-03-01 PROCEDURE — 74011636320 HC RX REV CODE- 636/320: Performed by: INTERNAL MEDICINE

## 2019-03-01 PROCEDURE — 74011250636 HC RX REV CODE- 250/636: Performed by: INTERNAL MEDICINE

## 2019-03-01 PROCEDURE — 77030028837 HC SYR ANGI PWR INJ COEU -A: Performed by: INTERNAL MEDICINE

## 2019-03-01 PROCEDURE — 77030030195 HC CATH ANGI DX PRF4 MRTM -A: Performed by: INTERNAL MEDICINE

## 2019-03-01 PROCEDURE — 77030003623 HC NDL PERC VASC TELE -C: Performed by: INTERNAL MEDICINE

## 2019-03-01 PROCEDURE — 93458 L HRT ARTERY/VENTRICLE ANGIO: CPT | Performed by: INTERNAL MEDICINE

## 2019-03-01 PROCEDURE — 74011250636 HC RX REV CODE- 250/636

## 2019-03-01 PROCEDURE — 99152 MOD SED SAME PHYS/QHP 5/>YRS: CPT | Performed by: INTERNAL MEDICINE

## 2019-03-01 PROCEDURE — C1769 GUIDE WIRE: HCPCS | Performed by: INTERNAL MEDICINE

## 2019-03-01 PROCEDURE — 82962 GLUCOSE BLOOD TEST: CPT

## 2019-03-01 PROCEDURE — C1894 INTRO/SHEATH, NON-LASER: HCPCS | Performed by: INTERNAL MEDICINE

## 2019-03-01 PROCEDURE — 77030029065 HC DRSG HEMO QCLOT ZMED -B: Performed by: INTERNAL MEDICINE

## 2019-03-01 RX ORDER — SODIUM CHLORIDE 9 MG/ML
100 INJECTION, SOLUTION INTRAVENOUS CONTINUOUS
Status: DISCONTINUED | OUTPATIENT
Start: 2019-03-01 | End: 2019-03-01 | Stop reason: HOSPADM

## 2019-03-01 RX ORDER — LIDOCAINE HYDROCHLORIDE 10 MG/ML
INJECTION, SOLUTION EPIDURAL; INFILTRATION; INTRACAUDAL; PERINEURAL AS NEEDED
Status: DISCONTINUED | OUTPATIENT
Start: 2019-03-01 | End: 2019-03-01 | Stop reason: HOSPADM

## 2019-03-01 RX ORDER — HEPARIN SODIUM 200 [USP'U]/100ML
INJECTION, SOLUTION INTRAVENOUS
Status: COMPLETED | OUTPATIENT
Start: 2019-03-01 | End: 2019-03-01

## 2019-03-01 RX ORDER — MIDAZOLAM HYDROCHLORIDE 1 MG/ML
INJECTION, SOLUTION INTRAMUSCULAR; INTRAVENOUS AS NEEDED
Status: DISCONTINUED | OUTPATIENT
Start: 2019-03-01 | End: 2019-03-01 | Stop reason: HOSPADM

## 2019-03-01 RX ORDER — ACETAMINOPHEN 325 MG/1
650 TABLET ORAL
Status: DISCONTINUED | OUTPATIENT
Start: 2019-03-01 | End: 2019-03-01 | Stop reason: HOSPADM

## 2019-03-01 RX ORDER — FENTANYL CITRATE 50 UG/ML
INJECTION, SOLUTION INTRAMUSCULAR; INTRAVENOUS AS NEEDED
Status: DISCONTINUED | OUTPATIENT
Start: 2019-03-01 | End: 2019-03-01 | Stop reason: HOSPADM

## 2019-03-01 RX ORDER — SODIUM CHLORIDE 0.9 % (FLUSH) 0.9 %
5-40 SYRINGE (ML) INJECTION AS NEEDED
Status: DISCONTINUED | OUTPATIENT
Start: 2019-03-01 | End: 2019-03-01 | Stop reason: HOSPADM

## 2019-03-01 RX ORDER — SODIUM CHLORIDE 0.9 % (FLUSH) 0.9 %
5-40 SYRINGE (ML) INJECTION EVERY 8 HOURS
Status: DISCONTINUED | OUTPATIENT
Start: 2019-03-01 | End: 2019-03-01 | Stop reason: HOSPADM

## 2019-03-01 RX ORDER — CARVEDILOL 6.25 MG/1
3.12 TABLET ORAL 2 TIMES DAILY
Qty: 60 TAB | Refills: 6 | Status: SHIPPED | OUTPATIENT
Start: 2019-03-01 | End: 2021-11-16

## 2019-03-01 NOTE — DISCHARGE INSTRUCTIONS
Cardiac Catheterization  Discharge Instructions     Cardiology Discharge Summary     Patient ID:  Iraj Patel  [id number]  38 y.o.  1950    Admit Date: 3/1/2019    Discharge Date: 3/1/2019           Patient Instructions:         Referenced discharge instructions provided by nursing for diet and activity. Follow-up with Dr Elvia Reaves 2-3 weeks     Signed:  Lisa Daniel MD  3/1/2019  4:59 PM       Do not drive, operate any machinery, or sign any legal documents for 24 hours after your procedure. You must have someone to drive you home.  You may take a shower 24 hours after your cardiac catheterization. Be sure to get the dressing wet and then remove it; gently wash the area with warm soapy water. Pat dry and leave open to air. To help prevent infections, be sure to keep the cath site clean and dry. No lotions, creams, powders, ointments, etc. in the cath site for approximately 1 week.  Do not take a tub bath, get in a hot tub or swimming pool for approximately 5 days or until the cath site is completely healed.  No strenuous activity or heavy lifting over 10 lbs. for  2 days.  Drink plenty of fluids for 24-48 hours after your cath to flush the contrast dye from your kidneys. No alcoholic beverages for 24 hours. You may resume your previous diet after your cath.  After your cath, some bruising or discomfort is common during the healing process. Tylenol, 1-2 tablets every 6 hours as needed, is recommended if you experience any discomfort. If you experience any signs or symptoms of infection such as fever, chills, or poorly healing incision, persistent tenderness or swelling in the groin, redness and/or warmth to the touch, numbness, significant tingling or pain at the groin site or affected extremity, rash, drainage from the cath site, or if the leg feels tight or swollen, call your physician right away.      If bleeding at the cath site occurs, take a clean gauze pad and apply direct pressure to the groin just above the puncture site. Call 911 immediately, and continue to apply direct pressure until an ambulance gets to your location.  You may return to work  3 days after your cardiac cath.  The day after your procedure, call your doctors office for a follow-up appointment in the office for  2-3 weeks,  unless previously arranged. Nurse Signature Date      [address] [address]   [phone]  [facility] [address] [facility] [address] [facility]    [email or web address]

## 2019-03-01 NOTE — PROGRESS NOTES
Discharge instructions reviewed with patient and family. Allowed adequate time to ask questions, all questions answered. Printed copy of AVS & prescriptions given to patient. All belongings gathered, IV and tele discontinued. Transported via wheelchair to main entrance and into care of family.

## 2019-03-01 NOTE — PROGRESS NOTES
HYPOGLYCEMIC EPISODE DOCUMENTATION    Patient with hypoglycemic episode(s) at 1306(time) on 3/1/2019(date). BG value(s) pre-treatment 66    Was patient symptomatic?  [] yes, [x] no  Patient was treated with the following rescue medications/treatments: [] D50                [] Glucose tablets                [] Glucagon                [x] 8oz juice                [] 6oz reg soda                [] 8oz low fat milk  BG value post-treatment: 84  Once BG treated and value greater than 80mg/dl, pt was provided with the following:  [] snack  [x] meal

## 2019-03-01 NOTE — Clinical Note
Single view of the left ventricle obtained using power injection. Total volume = 39 mL. Rate = 13 mL/sec. Pressure = 900 PSI.

## 2019-03-01 NOTE — PROGRESS NOTES
LHC, cor angio, LV gram completed s complic   [place]     Findings    1- Diffuse branch  Vessel CAD, 100 distal Cx, subtotal Diag  2- Dilated LV with moderately depressed systolic fxn  3- Nl LVEDP    [facility]    Medical Rx

## 2019-03-01 NOTE — Clinical Note
TRANSFER - OUT REPORT:  
 
Verbal report given to: [facility], RN. Report consisted of patient's Situation, Background, Assessment and  
Recommendations(SBAR). Opportunity for questions and clarification was provided. Patient transported with a Registered Nurse and [address] [place] [address] / Patient Care Tech. Patient transported to: [address] [place] [address].

## 2019-04-25 LAB
BUN SERPL-MCNC: 16 MG/DL (ref 8–27)
BUN/CREAT SERPL: 20 (ref 10–24)
CALCIUM SERPL-MCNC: 9.6 MG/DL (ref 8.6–10.2)
CHLORIDE SERPL-SCNC: 110 MMOL/L (ref 96–106)
CO2 SERPL-SCNC: 22 MMOL/L (ref 20–29)
CREAT SERPL-MCNC: 0.8 MG/DL (ref 0.76–1.27)
CREAT UR-MCNC: NORMAL MG/DL
EST. AVERAGE GLUCOSE BLD GHB EST-MCNC: 154 MG/DL
GLUCOSE SERPL-MCNC: 132 MG/DL (ref 65–99)
HBA1C MFR BLD: 7 % (ref 4.8–5.6)
MICROALBUMIN UR-MCNC: NORMAL
POTASSIUM SERPL-SCNC: 4.5 MMOL/L (ref 3.5–5.2)
SODIUM SERPL-SCNC: 145 MMOL/L (ref 134–144)

## 2019-04-29 ENCOUNTER — OFFICE VISIT (OUTPATIENT)
Dept: ENDOCRINOLOGY | Age: 69
End: 2019-04-29

## 2019-04-29 VITALS
HEART RATE: 77 BPM | WEIGHT: 304.1 LBS | HEIGHT: 72 IN | DIASTOLIC BLOOD PRESSURE: 68 MMHG | BODY MASS INDEX: 41.19 KG/M2 | SYSTOLIC BLOOD PRESSURE: 120 MMHG

## 2019-04-29 DIAGNOSIS — I10 ESSENTIAL HYPERTENSION, BENIGN: ICD-10-CM

## 2019-04-29 DIAGNOSIS — E78.5 HYPERLIPIDEMIA LDL GOAL <70: ICD-10-CM

## 2019-04-29 RX ORDER — INSULIN GLARGINE 100 [IU]/ML
INJECTION, SOLUTION SUBCUTANEOUS
Qty: 150 ML | Refills: 3
Start: 2019-04-29 | End: 2019-10-29

## 2019-04-29 NOTE — PROGRESS NOTES
Chief Complaint   Patient presents with    Diabetes     pcp and pharmacy confirmed     History of Present Illness: Anselmo Chun is a 80 y.o. male here for follow up of diabetes. Weight up 4 lbs since last visit in 10/18. Ended up staying on 76 units of lantus bid but instead decreased the humalog to 30 units before dinner but will take 2-4 more units if his sugar is over 150 at dinner. Fasting sugars are in the 110-130s and only once was under 80. Had a stress test that showed some abnormalities. On 3/1/19, Dr. Davian Langston did a heart cath and didn't need any intervention but his EF was 30-35% so his BP regimen has been changed to coreg 1/2 tab bid and entresto and due to f/u with him on 5/3/19. His BP has been closer to 140s on recent. Current Outpatient Medications   Medication Sig    insulin glargine (LANTUS SOLOSTAR U-100 INSULIN) 100 unit/mL (3 mL) inpn Inject 76 units in the morning and 76 units in the evening--Dose change 4/29/19--updated med list--did not send prescription to the pharmacy    sacubitril-valsartan (ENTRESTO) 24 mg/26 mg tablet Take 1 Tab by mouth two (2) times a day.  carvedilol (COREG) 6.25 mg tablet Take 0.5 Tabs by mouth two (2) times a day.  VICTOZA 3-STEFANIE 0.6 mg/0.1 mL (18 mg/3 mL) pnij INJECT 1.8 MG UNDER THE SKIN DAILY    atorvastatin (LIPITOR) 80 mg tablet TAKE 1 TABLET DAILY    FREESTYLE LITE STRIPS strip USE TO TEST THREE TIMES A DAY    HUMALOG KWIKPEN INSULIN 100 unit/mL kwikpen INJECT 30 TO 40 UNITS BEFORE DINNER    BD ULTRA-FINE SHORT PEN NEEDLE 31 gauge x 5/16\" ndle USE AS DIRECTED 3 TIMES DAILY    metFORMIN (GLUCOPHAGE) 1,000 mg tablet TAKE 1 TABLET TWICE A DAY WITH MEALS    docusate sodium (COLACE) 100 mg capsule Take 100 mg by mouth nightly.  lansoprazole (PREVACID) 30 mg capsule Take  by mouth Daily (before breakfast).     FREESTYLE SYSTEM KIT monitoring kit Test 3 times daily Dx: 250.02    FREESTYLE LANCETS 28 gauge misc Test 3 times daily Dx: 250.02    aspirin delayed-release 81 mg tablet Take 162 mg by mouth nightly.  CETIRIZINE HCL (ZYRTEC PO) Take 10 mg by mouth nightly.  coenzyme q10 200 mg Cap Take  by mouth nightly.  GLUCOSAMINE SULFATE (GLUCOSAMINE PO) Take  by mouth as directed.  MULTIVITAMINS (MULTIVITAMIN PO) Take  by mouth daily. No current facility-administered medications for this visit. No Known Allergies     Review of Systems:  - Eyes: no blurry vision or double vision  - Cardiovascular: no chest pain  - Respiratory: no shortness of breath  - Musculoskeletal: no myalgias  - Neurological: no numbness/tingling in extremities    Physical Examination:  Blood pressure 143/76, pulse 77, height 6' (1.829 m), weight 304 lb 1.6 oz (137.9 kg). Repeat manually 120/68 in left arm.  - General: pleasant, no distress, good eye contact   - Neck: no carotid bruits  - Cardiovascular: regular, normal rate, nl s1 and s2, no m/r/g,   - Respiratory: clear bilaterally  - Integumentary: no edema,   - Psychiatric: normal mood and affect    Data Reviewed:   Component      Latest Ref Rng & Units 4/24/2019 4/24/2019 4/24/2019           8:04 AM  8:04 AM  8:04 AM   Glucose      65 - 99 mg/dL 132 (H)     BUN      8 - 27 mg/dL 16     Creatinine      0.76 - 1.27 mg/dL 0.80     GFR est non-AA      >59 mL/min/1.73 92     GFR est AA      >59 mL/min/1.73 107     BUN/Creatinine ratio      10 - 24 20     Sodium      134 - 144 mmol/L 145 (H)     Potassium      3.5 - 5.2 mmol/L 4.5     Chloride      96 - 106 mmol/L 110 (H)     CO2      20 - 29 mmol/L 22     Calcium      8.6 - 10.2 mg/dL 9.6     Hemoglobin A1c, (calculated)      4.8 - 5.6 %   7.0 (H)   Estimated average glucose      mg/dL   154   Creatinine, urine      mg/dL  CANCELED    Microalbumin, urine        CANCELED        Assessment/Plan:     1.  DM w/o complication type II, uncontrolled (250.02) his most recent Hgb A1c was 7% in 4/19 down from 7.3% in 10/18 down from 7.9% in 4/18 up from 7% in 10/17 down from 8.5% in 5/17 up from 8.2% in 11/16 up from 7.7% in 6/16 down from 8.4% in 3/16 up from 7.5% in 10/15 up from 7.4% in 5/15 stable from 12/14 down from 7.6% in 7/14 stable from 3/14 up from 7.5% in 11/13 down from 7.7% in July up from 7.1% in March 2013 down from 7.7% in Nov down from 8.3% in August up from 7.7% in April stable from 7.6% in January 2012 down from 8.8% in October 2011 up from 8.4% in July, 7.4% in April stable from 7.5% in January up from 6.7% in October 2010 down from 8.6% in June. His A1c is back at goal so no changes needed  - cont Lantus 76 units in am and 76 units in pm as 2 separate injections    - cont Victoza 1.8 mg daily   - cont Metformin 1000 mg bid  - cont humalog 30-35 units before dinner + 5 units for every 50 mg/dl above 150 mg/dl  - take humalog 5 units for every 50 mg/dl above 150 mg/dl at breakfast and bedtime  - check bs 3 times daily  - optho UTD 4/18--will obtain report  - microalbumin nl 12/14, up to 43 in 11/16, down to 10 in 5/17 and 13 in 4/18--repeat today  - foot exam done 10/18  - check A1c and cmp prior to next visit     2. Unspecified essential hypertension (401.9) his BP was at goal < 140/90.    -  cont current regimen for now     3. Other and unspecified hyperlipidemia (272.4) Given DM and CAD, Goal LDL < 70, non-HDL < 100, and TG < 150. Lipids at goal in November 2013 and July 2014 and Oct 2015. LDL 79 in 11/16 with higher A1c but down to 57 in 10/17 with lower A1c. LDL 69 in 4/18 and 54 in 10/18  - cont Lipitor 80 daily  - check lipids prior to next visit         Patient Instructions   1) Your A1c is 7% down from 7.3%. Keep up the good work. 2) Your BUN and creatinine are markers of kidney function. Your values are normal.    3) Your blood pressure was just slightly high today but I'll defer to Dr. Yari Colon whom you'll see on Friday. Follow-up and Dispositions    · Return in about 6 months (around 10/29/2019).                Copy sent to:   Miguel Sadler [phone]    Lab follow up: 5/1/19    Sent him the following message in a letter:    Resulted Orders   MICROALBUMIN, UR, RAND W/ MICROALB/CREAT RATIO (Collected: 4/30/2019  1:53 PM)   Result Value Ref Range    Creatinine, urine 98.1 Not Estab. mg/dL    Microalbumin, urine 4.5 Not Estab. ug/mL    Microalb/Creat ratio (ug/mg creat.) 4.6 0.0 - 30.0 mg/g creat      Comment:                           Normal:                0.0 -  30.0                       Albuminuria:          31.0 - 300.0                       Clinical albuminuria:       >300.0      Narrative    Performed at:  [address]  [id number]  : Loraine Jang MD, Phone:  [phone]       Microalbumin/creatinine ratio is a marker of the amount of protein in your urine. Goal is less than 30. Your value is normal. This indicates that your kidneys are not being affected by your  diabetes and/or blood pressure.

## 2019-04-29 NOTE — PATIENT INSTRUCTIONS
1) Your A1c is 7% down from 7.3%. Keep up the good work. 2) Your BUN and creatinine are markers of kidney function. Your values are normal.    3) Your blood pressure was just slightly high today but on repeat was back down so I'll defer to Dr. Desmond Bright whom you'll see on Friday. 4) I'll send you a letter with your urine results.

## 2019-05-01 LAB
ALBUMIN/CREAT UR: 4.6 MG/G CREAT (ref 0–30)
CREAT UR-MCNC: 98.1 MG/DL
MICROALBUMIN UR-MCNC: 4.5 UG/ML

## 2019-05-15 RX ORDER — PEN NEEDLE, DIABETIC 31 GX5/16"
NEEDLE, DISPOSABLE MISCELLANEOUS
Qty: 300 PEN NEEDLE | Refills: 3 | Status: SHIPPED | OUTPATIENT
Start: 2019-05-15 | End: 2022-06-03 | Stop reason: SDUPTHER

## 2019-05-15 RX ORDER — METFORMIN HYDROCHLORIDE 1000 MG/1
TABLET ORAL
Qty: 180 TAB | Refills: 3 | Status: SHIPPED | OUTPATIENT
Start: 2019-05-15 | End: 2020-05-04 | Stop reason: SDUPTHER

## 2019-07-26 RX ORDER — INSULIN LISPRO 100 [IU]/ML
INJECTION, SOLUTION INTRAVENOUS; SUBCUTANEOUS
Qty: 30 ML | Refills: 3 | Status: SHIPPED | OUTPATIENT
Start: 2019-07-26 | End: 2020-02-25 | Stop reason: SDUPTHER

## 2019-10-26 LAB
ALBUMIN SERPL-MCNC: 4 G/DL (ref 3.6–4.8)
ALBUMIN/GLOB SERPL: 1.7 {RATIO} (ref 1.2–2.2)
ALP SERPL-CCNC: 98 IU/L (ref 39–117)
ALT SERPL-CCNC: 36 IU/L (ref 0–44)
AST SERPL-CCNC: 29 IU/L (ref 0–40)
BILIRUB SERPL-MCNC: 0.3 MG/DL (ref 0–1.2)
BUN SERPL-MCNC: 15 MG/DL (ref 8–27)
BUN/CREAT SERPL: 17 (ref 10–24)
CALCIUM SERPL-MCNC: 9.2 MG/DL (ref 8.6–10.2)
CHLORIDE SERPL-SCNC: 108 MMOL/L (ref 96–106)
CHOLEST SERPL-MCNC: 104 MG/DL (ref 100–199)
CO2 SERPL-SCNC: 22 MMOL/L (ref 20–29)
CREAT SERPL-MCNC: 0.9 MG/DL (ref 0.76–1.27)
EST. AVERAGE GLUCOSE BLD GHB EST-MCNC: 148 MG/DL
GLOBULIN SER CALC-MCNC: 2.4 G/DL (ref 1.5–4.5)
GLUCOSE SERPL-MCNC: 142 MG/DL (ref 65–99)
HBA1C MFR BLD: 6.8 % (ref 4.8–5.6)
HDLC SERPL-MCNC: 33 MG/DL
INTERPRETATION, 910389: NORMAL
LDLC SERPL CALC-MCNC: 54 MG/DL (ref 0–99)
Lab: NORMAL
POTASSIUM SERPL-SCNC: 4.6 MMOL/L (ref 3.5–5.2)
PROT SERPL-MCNC: 6.4 G/DL (ref 6–8.5)
SODIUM SERPL-SCNC: 144 MMOL/L (ref 134–144)
TRIGL SERPL-MCNC: 84 MG/DL (ref 0–149)
VLDLC SERPL CALC-MCNC: 17 MG/DL (ref 5–40)

## 2019-10-28 RX ORDER — ATORVASTATIN CALCIUM 80 MG/1
TABLET, FILM COATED ORAL
Qty: 90 TAB | Refills: 4 | Status: SHIPPED | OUTPATIENT
Start: 2019-10-28

## 2019-10-29 ENCOUNTER — OFFICE VISIT (OUTPATIENT)
Dept: ENDOCRINOLOGY | Age: 69
End: 2019-10-29

## 2019-10-29 VITALS
RESPIRATION RATE: 16 BRPM | DIASTOLIC BLOOD PRESSURE: 61 MMHG | OXYGEN SATURATION: 97 % | BODY MASS INDEX: 39.85 KG/M2 | HEART RATE: 77 BPM | WEIGHT: 294.2 LBS | HEIGHT: 72 IN | SYSTOLIC BLOOD PRESSURE: 126 MMHG

## 2019-10-29 DIAGNOSIS — E78.5 HYPERLIPIDEMIA LDL GOAL <70: ICD-10-CM

## 2019-10-29 DIAGNOSIS — I10 ESSENTIAL HYPERTENSION, BENIGN: ICD-10-CM

## 2019-10-29 RX ORDER — INSULIN GLARGINE 100 [IU]/ML
INJECTION, SOLUTION SUBCUTANEOUS
Qty: 150 ML | Refills: 3
Start: 2019-10-29 | End: 2019-12-19

## 2019-10-29 NOTE — PROGRESS NOTES
Lab Results   Component Value Date/Time    Hemoglobin A1c 6.8 (H) 10/25/2019 09:27 AM    Hemoglobin A1c 7.0 (H) 04/24/2019 08:04 AM    Hemoglobin A1c 7.3 (H) 10/24/2018 08:42 AM

## 2019-10-29 NOTE — PATIENT INSTRUCTIONS
1) Decrease the lantus to 72 units in the morning as 2 smaller shots of 36 units back to back. Stay on 76 units at night. As long as your sugars are staying under 130 in the morning after 4 days, then try cutting back by 4 more units on the morning dose every 4 days as long as your sugars are staying under 130 in the morning. 2) Your cholesterol and liver and kidney are normal and blood pressure is very good.

## 2019-11-04 RX ORDER — BLOOD-GLUCOSE METER
KIT MISCELLANEOUS
Qty: 300 STRIP | Refills: 3 | Status: SHIPPED | OUTPATIENT
Start: 2019-11-04 | End: 2020-05-04 | Stop reason: ALTCHOICE

## 2019-12-19 RX ORDER — INSULIN GLARGINE 100 [IU]/ML
INJECTION, SOLUTION SUBCUTANEOUS
Qty: 150 ML | Refills: 4
Start: 2019-12-19 | End: 2019-12-24 | Stop reason: SDUPTHER

## 2020-01-02 ENCOUNTER — TELEPHONE (OUTPATIENT)
Dept: ENDOCRINOLOGY | Age: 70
End: 2020-01-02

## 2020-01-02 RX ORDER — INSULIN GLARGINE 100 [IU]/ML
INJECTION, SOLUTION SUBCUTANEOUS
Qty: 150 ML | Refills: 1 | Status: SHIPPED | OUTPATIENT
Start: 2020-01-02 | End: 2020-05-04

## 2020-01-02 NOTE — TELEPHONE ENCOUNTER
Patient's wife, Carmelo Sharif, called to talk to someone about the difficulty she's having with [facility] - getting her 's Lantus refilled. Carmelo Sharif can be reached at:  [phone].

## 2020-01-02 NOTE — TELEPHONE ENCOUNTER
Spoke to Mrs. Ijeoma Hassan (HIPPA verified) and she stated that [facility] would not refill Mr. Viki Freeman Lantus due to her insurance not working with [facility] anymore. Mrs. Ijeoma Hassan stated that Mr. Ijeoma Hassan is currently out of his Lantus and would need a prescription sent to the [facility] on [address] and [address] [facility].

## 2020-01-31 ENCOUNTER — TELEPHONE (OUTPATIENT)
Dept: ENDOCRINOLOGY | Age: 70
End: 2020-01-31

## 2020-01-31 NOTE — TELEPHONE ENCOUNTER
----- Message from Amrita Rosa sent at 1/31/2020  9:01 AM EST -----  Regarding: Dr. Reese Schreiber (if not patient): Franco Chang- wife      Relationship of caller (if not patient):      Best contact number(s):   [phone]    Name of medication and dosage if known: \"Victoza\" 1 month supply      Is patient out of this medication (yes/no): yes      Pharmacy name: [facility]- not mail order    Pharmacy listed in chart? (yes/no): yes  Pharmacy phone number:      Details to clarify the request:      Amrita Rosa

## 2020-02-25 ENCOUNTER — TELEPHONE (OUTPATIENT)
Dept: ENDOCRINOLOGY | Age: 70
End: 2020-02-25

## 2020-02-25 RX ORDER — INSULIN LISPRO 100 [IU]/ML
INJECTION, SOLUTION INTRAVENOUS; SUBCUTANEOUS
Qty: 15 ML | Refills: 11 | Status: SHIPPED | OUTPATIENT
Start: 2020-02-25 | End: 2020-02-26 | Stop reason: CLARIF

## 2020-02-25 NOTE — TELEPHONE ENCOUNTER
Jmaes Lisa would like a month supply of Humalog Kwik pen sen to the [facility] in [facility] . She stated they have not met the deductible.

## 2020-02-26 ENCOUNTER — TELEPHONE (OUTPATIENT)
Dept: ENDOCRINOLOGY | Age: 70
End: 2020-02-26

## 2020-02-26 RX ORDER — INSULIN ASPART 100 [IU]/ML
INJECTION, SOLUTION INTRAVENOUS; SUBCUTANEOUS
Qty: 15 ML | Refills: 11 | Status: SHIPPED | OUTPATIENT
Start: 2020-02-26 | End: 2020-05-04

## 2020-02-26 NOTE — TELEPHONE ENCOUNTER
Please call him to let him know I will be changing humalog to novolog per his insurance but this will be dosed the same way. I sent this to [facility]. If he would like this sent to mail order in the future, just let me know.

## 2020-04-28 ENCOUNTER — TELEPHONE (OUTPATIENT)
Dept: ENDOCRINOLOGY | Age: 70
End: 2020-04-28

## 2020-04-28 NOTE — TELEPHONE ENCOUNTER
I have been recommending making an appointment online or over the phone for Jame Albert and all my patients said this has worked out very well for them. However, if he doesn't feel comfortable doing this, then he should still keep his appointment and then can go and do this sometime in the coming weeks.

## 2020-04-28 NOTE — TELEPHONE ENCOUNTER
----- Message from Chris Camarena sent at 4/28/2020 10:17 AM EDT -----  Regarding: Dr. Daisy Asencio Message/Vendor Calls    Caller's first and last name:  Mrs. Acuna Signs, pt's wife    Reason for call:  Requesting to speak with the nurse    Callback required yes/no and why:  yes    Best contact number(s):  [phone]    Details to clarify the request:  Inquiring if he should go get labs done before appt scheduled Monday 05/04/2020.     Chris Camarena

## 2020-05-04 ENCOUNTER — VIRTUAL VISIT (OUTPATIENT)
Dept: ENDOCRINOLOGY | Age: 70
End: 2020-05-04

## 2020-05-04 DIAGNOSIS — E78.5 HYPERLIPIDEMIA LDL GOAL <70: ICD-10-CM

## 2020-05-04 DIAGNOSIS — I10 ESSENTIAL HYPERTENSION, BENIGN: ICD-10-CM

## 2020-05-04 RX ORDER — INSULIN PUMP SYRINGE, 3 ML
EACH MISCELLANEOUS
Qty: 1 KIT | Refills: 0 | Status: SHIPPED | OUTPATIENT
Start: 2020-05-04

## 2020-05-04 RX ORDER — METFORMIN HYDROCHLORIDE 1000 MG/1
TABLET ORAL
Qty: 180 TAB | Refills: 3 | Status: SHIPPED | OUTPATIENT
Start: 2020-05-04 | End: 2021-04-23 | Stop reason: SDUPTHER

## 2020-05-04 RX ORDER — LANCETS
EACH MISCELLANEOUS
Qty: 300 EACH | Refills: 3 | Status: SHIPPED | OUTPATIENT
Start: 2020-05-04

## 2020-05-04 RX ORDER — INSULIN GLARGINE 100 [IU]/ML
INJECTION, SOLUTION SUBCUTANEOUS
Qty: 150 ML | Refills: 1
Start: 2020-05-04 | End: 2020-09-24

## 2020-05-04 RX ORDER — INSULIN ASPART 100 [IU]/ML
INJECTION, SOLUTION INTRAVENOUS; SUBCUTANEOUS
Qty: 15 ML | Refills: 11
Start: 2020-05-04 | End: 2020-11-10

## 2020-05-04 NOTE — PROGRESS NOTES
Chief Complaint   Patient presents with    Diabetes     pcp and pharmacy confirmed       **THIS IS A VIRTUAL VISIT VIA A VIDEO SYNCHRONOUS DISCUSSION. PATIENT AGREED TO HAVE THEIR CARE DELIVERED OVER A DOXY. ME VIDEO VISIT IN PLACE OF THEIR REGULARLY SCHEDULED OFFICE VISIT**    History of Present Illness: Elfego Faith is a 77 y.o. male here for follow up of diabetes. He has not been as diligent with his diet and had to go back up to 76 units bid of the lantus. Insurance now covers novolog in place of humalog and has been taking about 40 units before dinner. When he is careful with his diet and not under stress is able to keep his fasting sugars in the 100-130 range but otherwise can be in the 150s. Insurance no longer covering freestyle so I told his wife I would send a new meter and strips and lancets to his pharmacy to see what's covered. Compliant with BP and lipid regimen. We agreed to hold on lab draw now as it won't change my management.     Current Outpatient Medications   Medication Sig    insulin glargine (Lantus Solostar U-100 Insulin) 100 unit/mL (3 mL) inpn INJECT 76 UNITS IN THE MORNING AND 76 UNITS IN THE EVENING--Dose change 5/4/20--updated med list--did not send prescription to the pharmacy    insulin aspart U-100 (NovoLOG Flexpen U-100 Insulin) 100 unit/mL (3 mL) inpn INJECT 40 UNITS BEFORE DINNER--replaces humalog--Dose change 5/4/20--updated med list--did not send prescription to the pharmacy    liraglutide (VICTOZA 3-STEFANIE) 0.6 mg/0.1 mL (18 mg/3 mL) pnij Inject 1.8 mg daily--1 month supply until mail order arrives    FREESTYLE LITE STRIPS strip USE TO TEST THREE TIMES A DAY    atorvastatin (LIPITOR) 80 mg tablet TAKE 1 TABLET DAILY    BD ULTRA-FINE SHORT PEN NEEDLE 31 gauge x 5/16\" ndle USE AS DIRECTED THREE TIMES A DAY    metFORMIN (GLUCOPHAGE) 1,000 mg tablet TAKE 1 TABLET TWICE A DAY WITH MEALS    sacubitril-valsartan (ENTRESTO) 24 mg/26 mg tablet Take 1 Tab by mouth two (2) times a day.  carvedilol (COREG) 6.25 mg tablet Take 0.5 Tabs by mouth two (2) times a day.  docusate sodium (COLACE) 100 mg capsule Take 100 mg by mouth nightly.  lansoprazole (PREVACID) 30 mg capsule Take  by mouth Daily (before breakfast).  FREESTYLE SYSTEM KIT monitoring kit Test 3 times daily Dx: 250.02    FREESTYLE LANCETS 28 gauge misc Test 3 times daily Dx: 250.02    aspirin delayed-release 81 mg tablet Take 162 mg by mouth nightly.  CETIRIZINE HCL (ZYRTEC PO) Take 10 mg by mouth nightly.  coenzyme q10 200 mg Cap Take  by mouth nightly.  MULTIVITAMINS (MULTIVITAMIN PO) Take  by mouth daily. No current facility-administered medications for this visit. No Known Allergies     Review of Systems: PER HPI    Physical Examination:  - GENERAL: NCAT, Appears well nourished   - EYES: EOMI, non-icteric, no proptosis   - Ear/Nose/Throat: NCAT, no visible inflammation or masses   - CARDIOVASCULAR: no cyanosis, no visible JVD   - RESPIRATORY: respiratory effort normal without any distress or labored breathing   - MUSCULOSKELETAL: Normal ROM of neck and upper extremities observed   - SKIN: No rash on face  - NEUROLOGIC:  No facial asymmetry (Cranial nerve 7 motor function), No gaze palsy   - PSYCHIATRIC: Normal affect, Normal insight and judgement       Data Reviewed:   - none new for review    Assessment/Plan:     1.  DM w/o complication type II, uncontrolled (250.02) his most recent Hgb A1c was 6.8% in 10/19 down from 7% in 4/19 down from 7.3% in 10/18 down from 7.9% in 4/18 up from 7% in 10/17 down from 8.5% in 5/17 up from 8.2% in 11/16 up from 7.7% in 6/16 down from 8.4% in 3/16 up from 7.5% in 10/15 up from 7.4% in 5/15 stable from 12/14 down from 7.6% in 7/14 stable from 3/14 up from 7.5% in 11/13 down from 7.7% in July up from 7.1% in March 2013 down from 7.7% in Nov down from 8.3% in August up from 7.7% in April stable from 7.6% in January 2012 down from 8.8% in October 2011 up from 8.4% in July, 7.4% in April stable from 7.5% in January up from 6.7% in October 2010 down from 8.6% in June. His A1c would like have been higher but we'll not check this time and instead will check next time. - cont Lantus 76 units in am and cont 76 units in pm as 2 separate injections    - cont Victoza 1.8 mg daily   - cont Metformin 1000 mg bid  - cont novolog 40 units before dinner   - check bs 3 times daily  - optho UTD 4/18--will obtain report  - microalbumin nl 12/14, up to 43 in 11/16, down to 10 in 5/17 and 13 in 4/18 and 4 in 4/19  - foot exam done 10/19  - check A1c and cmp and microalbumin prior to next visit     2. Unspecified essential hypertension (401.9) his BP was at goal < 140/90.    -  cont current regimen for now     3. Other and unspecified hyperlipidemia (272.4) Given DM and CAD, Goal LDL < 70, non-HDL < 100, and TG < 150. Lipids at goal in November 2013 and July 2014 and Oct 2015. LDL 79 in 11/16 with higher A1c but down to 57 in 10/17 with lower A1c.   LDL 69 in 4/18 and 54 in 10/18 and in 10/19  - cont Lipitor 80 daily  - check lipids prior to next visit         Follow-up and Dispositions    · Return for 11/10/20 at 4:10pm.               Copy sent to:  Dr. Sumanth Solo [phone]

## 2020-05-06 RX ORDER — SACUBITRIL AND VALSARTAN 49; 51 MG/1; MG/1
1 TABLET, FILM COATED ORAL 2 TIMES DAILY
COMMUNITY

## 2020-05-06 RX ORDER — DICLOFENAC SODIUM 75 MG/1
75 TABLET, DELAYED RELEASE ORAL 2 TIMES DAILY
COMMUNITY

## 2020-09-24 RX ORDER — INSULIN GLARGINE 100 [IU]/ML
INJECTION, SOLUTION SUBCUTANEOUS
Qty: 135 ML | Refills: 3 | Status: SHIPPED | OUTPATIENT
Start: 2020-09-24 | End: 2021-05-11

## 2020-10-24 DIAGNOSIS — I10 ESSENTIAL HYPERTENSION, BENIGN: ICD-10-CM

## 2020-10-24 DIAGNOSIS — E78.5 HYPERLIPIDEMIA LDL GOAL <70: ICD-10-CM

## 2020-11-05 LAB
ALBUMIN SERPL-MCNC: 4.4 G/DL (ref 3.8–4.8)
ALBUMIN/CREAT UR: 23 MG/G CREAT (ref 0–29)
ALBUMIN/GLOB SERPL: 1.7 {RATIO} (ref 1.2–2.2)
ALP SERPL-CCNC: 127 IU/L (ref 39–117)
ALT SERPL-CCNC: 52 IU/L (ref 0–44)
AST SERPL-CCNC: 44 IU/L (ref 0–40)
BILIRUB SERPL-MCNC: 0.4 MG/DL (ref 0–1.2)
BUN SERPL-MCNC: 12 MG/DL (ref 8–27)
BUN/CREAT SERPL: 12 (ref 10–24)
CALCIUM SERPL-MCNC: 9.8 MG/DL (ref 8.6–10.2)
CHLORIDE SERPL-SCNC: 106 MMOL/L (ref 96–106)
CHOLEST SERPL-MCNC: 118 MG/DL (ref 100–199)
CO2 SERPL-SCNC: 18 MMOL/L (ref 20–29)
CREAT SERPL-MCNC: 0.97 MG/DL (ref 0.76–1.27)
CREAT UR-MCNC: 194.2 MG/DL
EST. AVERAGE GLUCOSE BLD GHB EST-MCNC: 192 MG/DL
GLOBULIN SER CALC-MCNC: 2.6 G/DL (ref 1.5–4.5)
GLUCOSE SERPL-MCNC: 127 MG/DL (ref 65–99)
HBA1C MFR BLD: 8.3 % (ref 4.8–5.6)
HDLC SERPL-MCNC: 37 MG/DL
INTERPRETATION, 910389: NORMAL
LDLC SERPL CALC-MCNC: 63 MG/DL (ref 0–99)
Lab: NORMAL
MICROALBUMIN UR-MCNC: 45.4 UG/ML
POTASSIUM SERPL-SCNC: 4.7 MMOL/L (ref 3.5–5.2)
PROT SERPL-MCNC: 7 G/DL (ref 6–8.5)
SODIUM SERPL-SCNC: 140 MMOL/L (ref 134–144)
TRIGL SERPL-MCNC: 94 MG/DL (ref 0–149)
VLDLC SERPL CALC-MCNC: 18 MG/DL (ref 5–40)

## 2020-11-10 ENCOUNTER — VIRTUAL VISIT (OUTPATIENT)
Dept: ENDOCRINOLOGY | Age: 70
End: 2020-11-10
Payer: MEDICARE

## 2020-11-10 DIAGNOSIS — I10 ESSENTIAL HYPERTENSION, BENIGN: ICD-10-CM

## 2020-11-10 DIAGNOSIS — E78.5 HYPERLIPIDEMIA LDL GOAL <70: ICD-10-CM

## 2020-11-10 PROCEDURE — 99214 OFFICE O/P EST MOD 30 MIN: CPT | Performed by: INTERNAL MEDICINE

## 2020-11-10 RX ORDER — INSULIN ASPART 100 [IU]/ML
INJECTION, SOLUTION INTRAVENOUS; SUBCUTANEOUS
Qty: 15 ML | Refills: 11
Start: 2020-11-10 | End: 2021-03-15 | Stop reason: SDUPTHER

## 2020-11-10 NOTE — PROGRESS NOTES
Chief Complaint   Patient presents with    Diabetes     pcp and pharmacy confirmed    Other     Doxy [phone]       **THIS IS A VIRTUAL VISIT VIA A VIDEO SYNCHRONOUS DISCUSSION. PATIENT AGREED TO HAVE THEIR CARE DELIVERED OVER A DOXY. ME VIDEO VISIT IN PLACE OF THEIR REGULARLY SCHEDULED OFFICE VISIT**    History of Present Illness: Adrianne Barrientos is a 72 y.o. male here for follow up of diabetes. Has seen readings in the 150-160s in the fasting and then other times it can be in the 110-130s. Still taking lantus 76 units bid but increased the novolog to 44 units before dinner. Weight up to 298 from 294 in 10/19. Compliant with BP and lipid regimen. Current Outpatient Medications   Medication Sig    insulin aspart U-100 (NovoLOG Flexpen U-100 Insulin) 100 unit/mL (3 mL) inpn INJECT 44 UNITS BEFORE DINNER--replaces humalog--Dose change 11/10/20--updated med list--did not send prescription to the pharmacy    insulin glargine (Lantus Solostar U-100 Insulin) 100 unit/mL (3 mL) inpn INJECT 76 UNITS IN THE MORNING AND 76 UNITS IN THE EVENING    diclofenac EC (VOLTAREN) 75 mg EC tablet Take 75 mg by mouth two (2) times a day.  sacubitriL-valsartan (Entresto) 49-51 mg tab tablet Take 1 Tab by mouth two (2) times a day.  metFORMIN (GLUCOPHAGE) 1,000 mg tablet TAKE 1 TABLET TWICE A DAY WITH MEALS    Blood-Glucose Meter monitoring kit Dispense whatever brand is covered by insurance. . Test 3 times daily Dx: E11.65    glucose blood VI test strips (blood glucose test) strip Dispense whatever brand is covered by insurance. . Test 3 times daily Dx: E11.65    lancets misc Dispense whatever brand is covered by insurance. . Test 3 times daily Dx: E11.65    liraglutide (VICTOZA 3-STEFANIE) 0.6 mg/0.1 mL (18 mg/3 mL) pnij Inject 1.8 mg daily--1 month supply until mail order arrives    atorvastatin (LIPITOR) 80 mg tablet TAKE 1 TABLET DAILY    BD ULTRA-FINE SHORT PEN NEEDLE 31 gauge x 5/16\" ndle USE AS DIRECTED THREE TIMES A DAY    carvedilol (COREG) 6.25 mg tablet Take 0.5 Tabs by mouth two (2) times a day.  docusate sodium (COLACE) 100 mg capsule Take 200 mg by mouth nightly.  lansoprazole (PREVACID) 30 mg capsule Take  by mouth Daily (before breakfast).  aspirin delayed-release 81 mg tablet Take 162 mg by mouth nightly.  CETIRIZINE HCL (ZYRTEC PO) Take 10 mg by mouth two (2) times a day.  coenzyme q10 200 mg Cap Take  by mouth nightly.  MULTIVITAMINS (MULTIVITAMIN PO) Take  by mouth daily. No current facility-administered medications for this visit.       No Known Allergies     Review of Systems: PER HPI    Physical Examination:  - GENERAL: NCAT, Appears well nourished   - EYES: EOMI, non-icteric, no proptosis   - Ear/Nose/Throat: NCAT, no visible inflammation or masses   - CARDIOVASCULAR: no cyanosis, no visible JVD   - RESPIRATORY: respiratory effort normal without any distress or labored breathing   - MUSCULOSKELETAL: Normal ROM of neck and upper extremities observed   - SKIN: No rash on face  - NEUROLOGIC:  No facial asymmetry (Cranial nerve 7 motor function), No gaze palsy   - PSYCHIATRIC: Normal affect, Normal insight and judgement       Data Reviewed:   Component      Latest Ref Rng & Units 11/4/2020 11/4/2020 11/4/2020 11/4/2020           9:16 AM  9:16 AM  9:16 AM  9:16 AM   Glucose      65 - 99 mg/dL 127 (H)      BUN      8 - 27 mg/dL 12      Creatinine      0.76 - 1.27 mg/dL 0.97      GFR est non-AA      >59 mL/min/1.73 79      GFR est AA      >59 mL/min/1.73 92      BUN/Creatinine ratio      10 - 24 12      Sodium      134 - 144 mmol/L 140      Potassium      3.5 - 5.2 mmol/L 4.7      Chloride      96 - 106 mmol/L 106      CO2      20 - 29 mmol/L 18 (L)      Calcium      8.6 - 10.2 mg/dL 9.8      Protein, total      6.0 - 8.5 g/dL 7.0      Albumin      3.8 - 4.8 g/dL 4.4      GLOBULIN, TOTAL      1.5 - 4.5 g/dL 2.6      A-G Ratio      1.2 - 2.2 1.7      Bilirubin, total      0.0 - 1.2 mg/dL 0.4      Alk. phosphatase      39 - 117 IU/L 127 (H)      AST      0 - 40 IU/L 44 (H)      ALT      0 - 44 IU/L 52 (H)      Cholesterol, total      100 - 199 mg/dL  118     Triglyceride      0 - 149 mg/dL  94     HDL Cholesterol      >39 mg/dL  37 (L)     VLDL Cholesterol Sukhdev      5 - 40 mg/dL  18     LDL Cholesterol      0 - 99 mg/dL  63     Creatinine, urine      Not Estab. mg/dL   194.2    Microalbumin, urine      Not Estab. ug/mL   45.4    Microalbumin/Creat. Ratio      0 - 29 mg/g creat   23    Hemoglobin A1c, (calculated)      4.8 - 5.6 %    8.3 (H)   Estimated average glucose      mg/dL    192       Assessment/Plan:     1. DM w/o complication type II, uncontrolled (250.02) his most recent Hgb A1c was 8.3% in 11/20 up from 6.8% in 10/19 down from 7% in 4/19 down from 7.3% in 10/18 down from 7.9% in 4/18 up from 7% in 10/17 down from 8.5% in 5/17 up from 8.2% in 11/16 up from 7.7% in 6/16 down from 8.4% in 3/16 up from 7.5% in 10/15 up from 7.4% in 5/15 stable from 12/14 down from 7.6% in 7/14 stable from 3/14 up from 7.5% in 11/13 down from 7.7% in July up from 7.1% in March 2013 down from 7.7% in Nov down from 8.3% in August up from 7.7% in April stable from 7.6% in January 2012 down from 8.8% in October 2011 up from 8.4% in July, 7.4% in April stable from 7.5% in January up from 6.7% in October 2010 down from 8.6% in June. His A1c is higher likely due to stress so won't make any changes  - cont Lantus 76 units in am (80 units if bs >150 in the am) and cont 76 units in pm as 2 separate injections    - cont Victoza 1.8 mg daily   - cont Metformin 1000 mg bid  - cont novolog 40 units before dinner   - check bs 3 times daily  - optho UTD 4/18--will obtain report  - microalbumin nl 12/14, up to 43 in 11/16, down to 10 in 5/17 and normal ever since, last in 11/20  - foot exam done 10/19  - check A1c and cmp prior to next visit     2.  Unspecified essential hypertension (401.9) his BP was at goal < 140/90.    - cont current regimen for now     3. Other and unspecified hyperlipidemia (272.4) Given DM and CAD, Goal LDL < 70, non-HDL < 100, and TG < 150. Lipids at goal in November 2013 and July 2014 and Oct 2015. LDL 79 in 11/16 with higher A1c but down to 57 in 10/17 with lower A1c. LDL 69 in 4/18 and 54 in 10/18 and in 10/19 and 63 in 11/20  - cont Lipitor 80 daily  - check lipids in 11/21       Patient Instructions   1) Your Hemoglobin A1c (3 month test of blood sugar) bee from 6.9% in 10/19 to 8.3% likely due to stress. 2) If your sugar is over 150 in the morning, plan on taking 80 units of lantus instead of 76.    3) ALT and AST are markers of liver function. Your values are likely elevated due to fatty deposition in the liver that can occur with weight gain. Hopefully with weight loss, these values will return to normal.    4) BUN and creatinine are markers of kidney function. Your values are normal.    5) Your cholesterol is at goal.    6) Microalbumin/creatinine ratio is a marker of the amount of protein in your urine. Goal is less than 30. Your value is normal. This indicates that your kidneys are not being affected by your uncontrolled diabetes and/or blood pressure. 7) Please come for a follow up visit on 5/11/21 at 3:50pm in our [place] office. 8) Take the enclosed lab slip to repeat your labs prior to next visit.           Follow-up and Dispositions    · Return 5/11/21 at 3:50pm.               Copy sent to:  Dr. Irina Woodruff [phone]

## 2020-11-10 NOTE — LETTER
11/10/2020 Mr. Urban Lefort [address]  [address] [id number] Dear Urban Lefort: Please find your most recent results below. Resulted Orders HEMOGLOBIN A1C WITH EAG (Collected: 11/4/2020  9:16 AM) Result Value Ref Range Hemoglobin A1c 8.3 (H) 4.8 - 5.6 % Comment:  
            Prediabetes: 5.7 - 6.4 Diabetes: >6.4 Glycemic control for adults with diabetes: <7.0 Estimated average glucose 192 mg/dL Narrative Performed at:  [address]  [id number] : Rika Valenzuela MD, Phone:  [phone] MICROALBUMIN, UR, RAND W/ MICROALB/CREAT RATIO (Collected: 11/4/2020  9:16 AM) Result Value Ref Range Creatinine, urine 194.2 Not Estab. mg/dL Microalbumin, urine 45.4 Not Estab. ug/mL Microalb/Creat ratio (ug/mg creat.) 23 0 - 29 mg/g creat Comment:  
                          Normal:                0 -  29 Moderately increased: 30 - 300 Severely increased:       >300 Narrative Performed at:  [address]  [id number] : Rika Valenzuela MD, Phone:  [phone] LIPID PANEL (Collected: 11/4/2020  9:16 AM) Result Value Ref Range Cholesterol, total 118 100 - 199 mg/dL Triglyceride 94 0 - 149 mg/dL HDL Cholesterol 37 (L) >39 mg/dL VLDL Cholesterol Sukhdev 18 5 - 40 mg/dL LDL Chol Calc (NIH) 63 0 - 99 mg/dL Narrative Performed at:  [address]  [id number] : Rika Valenzuela MD, Phone:  [phone] METABOLIC PANEL, COMPREHENSIVE (Collected: 11/4/2020  9:16 AM) Result Value Ref Range Glucose 127 (H) 65 - 99 mg/dL BUN 12 8 - 27 mg/dL Creatinine 0.97 0.76 - 1.27 mg/dL GFR est non-AA 79 >59 mL/min/1.73 GFR est AA 92 >59 mL/min/1.73  
 BUN/Creatinine ratio 12 10 - 24 Sodium 140 134 - 144 mmol/L Potassium 4.7 3.5 - 5.2 mmol/L Chloride 106 96 - 106 mmol/L  
 CO2 18 (L) 20 - 29 mmol/L Calcium 9.8 8.6 - 10.2 mg/dL Protein, total 7.0 6.0 - 8.5 g/dL Albumin 4.4 3.8 - 4.8 g/dL GLOBULIN, TOTAL 2.6 1.5 - 4.5 g/dL A-G Ratio 1.7 1.2 - 2.2 Bilirubin, total 0.4 0.0 - 1.2 mg/dL Alk. phosphatase 127 (H) 39 - 117 IU/L  
 AST (SGOT) 44 (H) 0 - 40 IU/L  
 ALT (SGPT) 52 (H) 0 - 44 IU/L Narrative Performed at:  [address]  [id number] : Brionna Garcia MD, Phone:  [phone] CVD REPORT (Collected: 11/4/2020  9:16 AM) Result Value Ref Range INTERPRETATION Note Comment:  
   Supplemental report is available. Narrative Performed at:  [address]  [id number] : Carrie Ferrer MD, Phone:  [phone] DIABETES PATIENT EDUCATION (Collected: 11/4/2020  9:16 AM) Result Value Ref Range PDF Image Not applicable Narrative Performed at:  [address]  [id number] : Carrie Ferrer MD, Phone:  [phone]  
 
 
1) Your Hemoglobin A1c (3 month test of blood sugar) bee from 6.9% in 10/19 to 8.3% likely due to stress. 2) If your sugar is over 150 in the morning, plan on taking 80 units of lantus instead of 76. 
 
3) ALT and AST are markers of liver function. Your values are likely elevated due to fatty deposition in the liver that can occur with weight gain. Hopefully with weight loss, these values will return to normal. 4) BUN and creatinine are markers of kidney function. Your values are normal. 
 
5) Your cholesterol is at goal. 6) Microalbumin/creatinine ratio is a marker of the amount of protein in your urine. Goal is less than 30.   Your value is normal. This indicates that your kidneys are not being affected by your uncontrolled diabetes and/or blood pressure. 7) Please come for a follow up visit on 5/11/21 at 3:50pm in our [place] office. 8) Take the enclosed lab slip to repeat your labs prior to next visit. Please call me if you have any questions: [phone] Sincerely, 
 
 
Analia Davis MD

## 2020-11-10 NOTE — PATIENT INSTRUCTIONS
1) Your Hemoglobin A1c (3 month test of blood sugar) bee from 6.9% in 10/19 to 8.3% likely due to stress. 2) If your sugar is over 150 in the morning, plan on taking 80 units of lantus instead of 76. 
 
3) ALT and AST are markers of liver function. Your values are likely elevated due to fatty deposition in the liver that can occur with weight gain. Hopefully with weight loss, these values will return to normal. 4) BUN and creatinine are markers of kidney function. Your values are normal. 
 
5) Your cholesterol is at goal. 6) Microalbumin/creatinine ratio is a marker of the amount of protein in your urine. Goal is less than 30. Your value is normal. This indicates that your kidneys are not being affected by your uncontrolled diabetes and/or blood pressure. 7) Please come for a follow up visit on 5/11/21 at 3:50pm in our [place] office. 8) Take the enclosed lab slip to repeat your labs prior to next visit.

## 2021-02-15 ENCOUNTER — TELEPHONE (OUTPATIENT)
Dept: ENDOCRINOLOGY | Age: 71
End: 2021-02-15

## 2021-02-15 NOTE — TELEPHONE ENCOUNTER
I printed off his last office note. Please fax to Dr. Virginia Knowles and let him know this was taken care of. Thanks.

## 2021-02-15 NOTE — TELEPHONE ENCOUNTER
----- Message from Gee Cruz sent at 2/15/2021 10:03 AM EST -----  Regarding: FW: Dr. Collazo Kin: [phone]    ----- Message -----  From: Juan Edwards: 2/15/2021   9:27 AM EST  To: [facility] [place]  Subject: Dr. Alexandra Vu Message/Vendor Calls    Caller's first and last name:Pt      Reason for call:Patient would like to know if Dr. Madeline Coleman could send his blood work results from his previous visit to his cardiologist, Dr. Tylor Lim at [phone]. Callback required yes/no and why:Yes, confirm.        Best contact number(s):[phone]      Details to clarify the request:n/a      Catracho Daniel

## 2021-03-15 RX ORDER — INSULIN ASPART 100 [IU]/ML
INJECTION, SOLUTION INTRAVENOUS; SUBCUTANEOUS
Qty: 15 ML | Refills: 11 | Status: SHIPPED | OUTPATIENT
Start: 2021-03-15 | End: 2021-05-11

## 2021-04-20 RX ORDER — LIRAGLUTIDE 6 MG/ML
INJECTION SUBCUTANEOUS
Qty: 27 ML | Refills: 3 | Status: SHIPPED | OUTPATIENT
Start: 2021-04-20 | End: 2022-04-20 | Stop reason: SDUPTHER

## 2021-04-23 RX ORDER — METFORMIN HYDROCHLORIDE 1000 MG/1
TABLET ORAL
Qty: 180 TAB | Refills: 3 | Status: SHIPPED | OUTPATIENT
Start: 2021-04-23 | End: 2022-04-18

## 2021-04-27 DIAGNOSIS — E78.5 HYPERLIPIDEMIA LDL GOAL <70: ICD-10-CM

## 2021-04-27 DIAGNOSIS — I10 ESSENTIAL HYPERTENSION, BENIGN: ICD-10-CM

## 2021-04-30 LAB
ALBUMIN SERPL-MCNC: 4.3 G/DL (ref 3.8–4.8)
ALBUMIN/GLOB SERPL: 1.9 {RATIO} (ref 1.2–2.2)
ALP SERPL-CCNC: 125 IU/L (ref 39–117)
ALT SERPL-CCNC: 49 IU/L (ref 0–44)
AST SERPL-CCNC: 37 IU/L (ref 0–40)
BILIRUB SERPL-MCNC: 0.2 MG/DL (ref 0–1.2)
BUN SERPL-MCNC: 15 MG/DL (ref 8–27)
BUN/CREAT SERPL: 17 (ref 10–24)
CALCIUM SERPL-MCNC: 9.9 MG/DL (ref 8.6–10.2)
CHLORIDE SERPL-SCNC: 107 MMOL/L (ref 96–106)
CO2 SERPL-SCNC: 21 MMOL/L (ref 20–29)
CREAT SERPL-MCNC: 0.88 MG/DL (ref 0.76–1.27)
EST. AVERAGE GLUCOSE BLD GHB EST-MCNC: 212 MG/DL
GLOBULIN SER CALC-MCNC: 2.3 G/DL (ref 1.5–4.5)
GLUCOSE SERPL-MCNC: 305 MG/DL (ref 65–99)
HBA1C MFR BLD: 9 % (ref 4.8–5.6)
POTASSIUM SERPL-SCNC: 4.5 MMOL/L (ref 3.5–5.2)
PROT SERPL-MCNC: 6.6 G/DL (ref 6–8.5)
SODIUM SERPL-SCNC: 142 MMOL/L (ref 134–144)

## 2021-05-11 ENCOUNTER — VIRTUAL VISIT (OUTPATIENT)
Dept: ENDOCRINOLOGY | Age: 71
End: 2021-05-11
Payer: MEDICARE

## 2021-05-11 DIAGNOSIS — I10 ESSENTIAL HYPERTENSION, BENIGN: ICD-10-CM

## 2021-05-11 DIAGNOSIS — E78.5 HYPERLIPIDEMIA LDL GOAL <70: ICD-10-CM

## 2021-05-11 PROCEDURE — 99443 PR PHYS/QHP TELEPHONE EVALUATION 21-30 MIN: CPT | Performed by: INTERNAL MEDICINE

## 2021-05-11 RX ORDER — INSULIN GLARGINE 100 [IU]/ML
INJECTION, SOLUTION SUBCUTANEOUS
Qty: 135 ML | Refills: 3
Start: 2021-05-11 | End: 2021-09-14

## 2021-05-11 RX ORDER — INSULIN ASPART 100 [IU]/ML
INJECTION, SOLUTION INTRAVENOUS; SUBCUTANEOUS
Qty: 15 ML | Refills: 11
Start: 2021-05-11 | End: 2021-12-09 | Stop reason: SDUPTHER

## 2021-05-11 NOTE — PROGRESS NOTES
Chief Complaint   Patient presents with    Diabetes     doxy [phone]    Other     pcp and pharmacy confirmed       **THIS IS A VIRTUAL VISIT VIA A TELEPHONE ENCOUNTER (COULDN'T GET DOXY TO CONNECT). PATIENT AGREED TO HAVE THEIR CARE DELIVERED OVER THE PHONE IN PLACE OF THEIR REGULARLY SCHEDULED OFFICE VISIT**    History of Present Illness: Twin Barnes is a 72 y.o. male here for follow up of diabetes. Hasn't been feeling well recently and has had more fatigue and went to his PCP today and was given something for allergies that sounds like a nasal spray and a pill too but no abx or steroids. Has been taking 46 units of novolog and 76 units bid of lantus along with metformin and victoza. Has seen some fasting sugars down to the 140s but they have frequently been over 200 with some as high as the 290s over the past month. No sweet drinks but does drink diet [employer] [facility]. Weight is 303 up from 298 lbs. Compliant with BP and lipid regimen. Has still been under stress that could be keeping his sugars up. Current Outpatient Medications   Medication Sig    metFORMIN (GLUCOPHAGE) 1,000 mg tablet TAKE 1 TABLET BY MOUTH TWICE A DAY WITH MEALS    liraglutide (Victoza 3-Murphy) 0.6 mg/0.1 mL (18 mg/3 mL) pnij INJECT 1.8 MG DAILY    insulin aspart U-100 (NovoLOG Flexpen U-100 Insulin) 100 unit/mL (3 mL) inpn INJECT 30 TO 40 UNITS UNDER THE SKIN BEFORE DINNER    insulin glargine (Lantus Solostar U-100 Insulin) 100 unit/mL (3 mL) inpn INJECT 76 UNITS IN THE MORNING AND 76 UNITS IN THE EVENING    diclofenac EC (VOLTAREN) 75 mg EC tablet Take 75 mg by mouth two (2) times a day.  sacubitriL-valsartan (Entresto) 49-51 mg tab tablet Take 1 Tab by mouth two (2) times a day.  Blood-Glucose Meter monitoring kit Dispense whatever brand is covered by insurance. . Test 3 times daily Dx: E11.65    glucose blood VI test strips (blood glucose test) strip Dispense whatever brand is covered by insurance. . Test 3 times daily Dx: E11.65    lancets misc Dispense whatever brand is covered by insurance. . Test 3 times daily Dx: E11.65    atorvastatin (LIPITOR) 80 mg tablet TAKE 1 TABLET DAILY    BD ULTRA-FINE SHORT PEN NEEDLE 31 gauge x 5/16\" ndle USE AS DIRECTED THREE TIMES A DAY    carvedilol (COREG) 6.25 mg tablet Take 0.5 Tabs by mouth two (2) times a day.  docusate sodium (COLACE) 100 mg capsule Take 200 mg by mouth nightly.  lansoprazole (PREVACID) 30 mg capsule Take  by mouth Daily (before breakfast).  aspirin delayed-release 81 mg tablet Take 162 mg by mouth nightly.  CETIRIZINE HCL (ZYRTEC PO) Take 10 mg by mouth two (2) times a day.  coenzyme q10 200 mg Cap Take  by mouth nightly.  MULTIVITAMINS (MULTIVITAMIN PO) Take  by mouth daily. No current facility-administered medications for this visit. No Known Allergies     Review of Systems: PER HPI    Physical Examination: NOT PERFORMED DUE TO THIS BEING A TELEPHONE CALL    Data Reviewed:   Component      Latest Ref Rng & Units 4/29/2021 4/29/2021           2:14 PM  2:14 PM   Glucose      65 - 99 mg/dL 305 (H)    BUN      8 - 27 mg/dL 15    Creatinine      0.76 - 1.27 mg/dL 0.88    GFR est non-AA      >59 mL/min/1.73 88    GFR est AA      >59 mL/min/1.73 101    BUN/Creatinine ratio      10 - 24 17    Sodium      134 - 144 mmol/L 142    Potassium      3.5 - 5.2 mmol/L 4.5    Chloride      96 - 106 mmol/L 107 (H)    CO2      20 - 29 mmol/L 21    Calcium      8.6 - 10.2 mg/dL 9.9    Protein, total      6.0 - 8.5 g/dL 6.6    Albumin      3.8 - 4.8 g/dL 4.3    GLOBULIN, TOTAL      1.5 - 4.5 g/dL 2.3    A-G Ratio      1.2 - 2.2 1.9    Bilirubin, total      0.0 - 1.2 mg/dL 0.2    Alk. phosphatase      39 - 117 IU/L 125 (H)    AST      0 - 40 IU/L 37    ALT      0 - 44 IU/L 49 (H)    Hemoglobin A1c, (calculated)      4.8 - 5.6 %  9.0 (H)   Estimated average glucose      mg/dL  212       Assessment/Plan:     1.  DM w/o complication type II, uncontrolled (250.02) his most recent Hgb A1c was 9% in 4/21 up from 8.3% in 11/20 up from 6.8% in 10/19 down from 7% in 4/19 down from 7.3% in 10/18 down from 7.9% in 4/18 up from 7% in 10/17 down from 8.5% in 5/17 up from 8.2% in 11/16 up from 7.7% in 6/16 down from 8.4% in 3/16 up from 7.5% in 10/15 up from 7.4% in 5/15 stable from 12/14 down from 7.6% in 7/14 stable from 3/14 up from 7.5% in 11/13 down from 7.7% in July up from 7.1% in March 2013 down from 7.7% in Nov down from 8.3% in August up from 7.7% in April stable from 7.6% in January 2012 down from 8.8% in October 2011 up from 8.4% in July, 7.4% in April stable from 7.5% in January up from 6.7% in October 2010 down from 8.6% in June. His A1c is higher likely due to stress so increased his lantus. - increase Lantus to 84 units in am and in pm as 2 separate injections    - cont Victoza 1.8 mg daily   - cont Metformin 1000 mg bid  - cont novolog 46 units before dinner   - check bs 3 times daily  - optho UTD 4/18--will obtain report  - microalbumin nl 12/14, up to 43 in 11/16, down to 10 in 5/17 and normal ever since, last in 11/20  - foot exam done 10/19  - check A1c and cmp and microalbumin prior to next visit     2. Unspecified essential hypertension (401.9) his BP was at goal < 140/90.    -  cont current regimen for now     3. Other and unspecified hyperlipidemia (272.4) Given DM and CAD, Goal LDL < 70, non-HDL < 100, and TG < 150. Lipids at goal in November 2013 and July 2014 and Oct 2015. LDL 79 in 11/16 with higher A1c but down to 57 in 10/17 with lower A1c. LDL 69 in 4/18 and 54 in 10/18 and in 10/19 and 63 in 11/20  - cont Lipitor 80 daily  - check lipids prior to next visit         Patient Instructions   1) Increase your lantus to 84 units twice daily as 2 shots of 42 units back to back in different locations to try and get your fasting sugars under 130.    2) Stay on the same dose of novolog 46 units before dinner.     3) If you have 2 or more readings under 110 in a week, then decrease your lantus insulin by 4 units to 80 units twice daily and if still going under 110 then go back to 76 units twice daily. 4) BUN and creatinine are markers of kidney function. Your values are normal.    5) ALT and AST are markers of liver function. Your values are almost back to normal.    6) Please come for a follow up visit on 11/16/21 at 3:50pm in our [place] office. 7) Take the enclosed lab slip to repeat your labs prior to your next visit. Follow-up and Dispositions    · Return 11/16/21 at 3:50pm.         I spent a total of 22 minutes on the day of this virtual visit with a telephone encounter. All questions were answered and a copy of the instructions were sent to him via a letter.           Copy sent to:  Dr. Genie Anthony [phone]

## 2021-05-11 NOTE — LETTER
5/11/2021 Mr. Thuy Naranjo 2021 [facility].[address] [id number] Dear Thuy Naranjo: Please find your most recent results below. Resulted Orders HEMOGLOBIN A1C WITH EAG Result Value Ref Range Hemoglobin A1c 9.0 (H) 4.8 - 5.6 % Estimated average glucose 212 mg/dL Narrative Performed at:  [address]  [id number] : Juana Chandra MD, Phone:  [phone] METABOLIC PANEL, COMPREHENSIVE Result Value Ref Range Glucose 305 (H) 65 - 99 mg/dL BUN 15 8 - 27 mg/dL Creatinine 0.88 0.76 - 1.27 mg/dL GFR est non-AA 88 >59 mL/min/1.73 GFR est  >59 mL/min/1.73  
 BUN/Creatinine ratio 17 10 - 24 Sodium 142 134 - 144 mmol/L Potassium 4.5 3.5 - 5.2 mmol/L Chloride 107 (H) 96 - 106 mmol/L  
 CO2 21 20 - 29 mmol/L Calcium 9.9 8.6 - 10.2 mg/dL Protein, total 6.6 6.0 - 8.5 g/dL Albumin 4.3 3.8 - 4.8 g/dL GLOBULIN, TOTAL 2.3 1.5 - 4.5 g/dL A-G Ratio 1.9 1.2 - 2.2 Bilirubin, total 0.2 0.0 - 1.2 mg/dL Alk. phosphatase 125 (H) 39 - 117 IU/L  
 AST (SGOT) 37 0 - 40 IU/L  
 ALT (SGPT) 49 (H) 0 - 44 IU/L Narrative Performed at:  [address]  [id number] : Juana Chandra MD, Phone:  [phone]  
 
 
1) Increase your lantus to 84 units twice daily as 2 shots of 42 units back to back in different locations to try and get your fasting sugars under 130. 
 
2) Stay on the same dose of novolog 46 units before dinner. 3) If you have 2 or more readings under 110 in a week, then decrease your lantus insulin by 4 units to 80 units twice daily and if still going under 110 then go back to 76 units twice daily. 4) BUN and creatinine are markers of kidney function. Your values are normal. 
 
5) ALT and AST are markers of liver function.   Your values are almost back to normal. 
 
6) Please come for a follow up visit on 11/16/21 at 3:50pm in our [place] office. 7) Take the enclosed lab slip to repeat your labs prior to your next visit. Please call me if you have any questions: [phone] Sincerely, 
 
 
Jerry Javier MD

## 2021-05-11 NOTE — PATIENT INSTRUCTIONS
1) Increase your lantus to 84 units twice daily as 2 shots of 42 units back to back in different locations to try and get your fasting sugars under 130. 
 
2) Stay on the same dose of novolog 46 units before dinner. 3) If you have 2 or more readings under 110 in a week, then decrease your lantus insulin by 4 units to 80 units twice daily and if still going under 110 then go back to 76 units twice daily. 4) BUN and creatinine are markers of kidney function. Your values are normal. 
 
5) ALT and AST are markers of liver function. Your values are almost back to normal. 
 
6) Please come for a follow up visit on 11/16/21 at 3:50pm in our [place] office. 7) Take the enclosed lab slip to repeat your labs prior to your next visit.

## 2021-07-15 RX ORDER — BLOOD SUGAR DIAGNOSTIC
STRIP MISCELLANEOUS
Qty: 300 STRIP | Refills: 3 | Status: SHIPPED | OUTPATIENT
Start: 2021-07-15 | End: 2022-09-09

## 2021-09-14 ENCOUNTER — TELEPHONE (OUTPATIENT)
Dept: ENDOCRINOLOGY | Age: 71
End: 2021-09-14

## 2021-09-14 RX ORDER — INSULIN GLARGINE 100 [IU]/ML
INJECTION, SOLUTION SUBCUTANEOUS
Qty: 150 ML | Refills: 3 | Status: SHIPPED | OUTPATIENT
Start: 2021-09-14 | End: 2021-12-24

## 2021-09-14 NOTE — TELEPHONE ENCOUNTER
----- Message from Leslie Larson sent at 9/14/2021  8:44 AM EDT -----  Regarding: Dr. Telma Martínez: [phone]  Medication Refill    Caller (if not patient):Bella      Relationship of caller (if not patient):wife      Best contact number(s):[phone]        Name of medication and dosage if known:lantus      Is patient out of this medication (yes/no):yes, pt will need his insulin tonight.        Pharmacy name:[facility]    Pharmacy listed in chart? (yes/no):yes  Pharmacy phone [phone]      Details to clarify the request:n/a      Leslie Larson

## 2021-11-02 DIAGNOSIS — I10 ESSENTIAL HYPERTENSION, BENIGN: ICD-10-CM

## 2021-11-02 DIAGNOSIS — E78.5 HYPERLIPIDEMIA LDL GOAL <70: ICD-10-CM

## 2021-11-05 LAB
ALBUMIN SERPL-MCNC: 4.1 G/DL (ref 3.8–4.8)
ALBUMIN/CREAT UR: 21 MG/G CREAT (ref 0–29)
ALBUMIN/GLOB SERPL: 1.6 {RATIO} (ref 1.2–2.2)
ALP SERPL-CCNC: 114 IU/L (ref 44–121)
ALT SERPL-CCNC: 69 IU/L (ref 0–44)
AST SERPL-CCNC: 49 IU/L (ref 0–40)
BILIRUB SERPL-MCNC: 0.3 MG/DL (ref 0–1.2)
BUN SERPL-MCNC: 12 MG/DL (ref 8–27)
BUN/CREAT SERPL: 13 (ref 10–24)
CALCIUM SERPL-MCNC: 10.2 MG/DL (ref 8.6–10.2)
CHLORIDE SERPL-SCNC: 110 MMOL/L (ref 96–106)
CHOLEST SERPL-MCNC: 120 MG/DL (ref 100–199)
CO2 SERPL-SCNC: 26 MMOL/L (ref 20–29)
CREAT SERPL-MCNC: 0.94 MG/DL (ref 0.76–1.27)
CREAT UR-MCNC: 120.1 MG/DL
EST. AVERAGE GLUCOSE BLD GHB EST-MCNC: 189 MG/DL
GLOBULIN SER CALC-MCNC: 2.6 G/DL (ref 1.5–4.5)
GLUCOSE SERPL-MCNC: 169 MG/DL (ref 65–99)
HBA1C MFR BLD: 8.2 % (ref 4.8–5.6)
HDLC SERPL-MCNC: 37 MG/DL
IMP & REVIEW OF LAB RESULTS: NORMAL
LDLC SERPL CALC-MCNC: 69 MG/DL (ref 0–99)
MICROALBUMIN UR-MCNC: 25.8 UG/ML
POTASSIUM SERPL-SCNC: 5 MMOL/L (ref 3.5–5.2)
PROT SERPL-MCNC: 6.7 G/DL (ref 6–8.5)
SODIUM SERPL-SCNC: 148 MMOL/L (ref 134–144)
TRIGL SERPL-MCNC: 66 MG/DL (ref 0–149)
VLDLC SERPL CALC-MCNC: 14 MG/DL (ref 5–40)

## 2021-11-16 ENCOUNTER — OFFICE VISIT (OUTPATIENT)
Dept: ENDOCRINOLOGY | Age: 71
End: 2021-11-16
Payer: COMMERCIAL

## 2021-11-16 VITALS
SYSTOLIC BLOOD PRESSURE: 143 MMHG | HEART RATE: 84 BPM | BODY MASS INDEX: 42.1 KG/M2 | WEIGHT: 310.8 LBS | HEIGHT: 72 IN | DIASTOLIC BLOOD PRESSURE: 75 MMHG

## 2021-11-16 DIAGNOSIS — E78.5 HYPERLIPIDEMIA LDL GOAL <70: ICD-10-CM

## 2021-11-16 DIAGNOSIS — I10 ESSENTIAL HYPERTENSION, BENIGN: ICD-10-CM

## 2021-11-16 PROCEDURE — 2022F DILAT RTA XM EVC RTNOPTHY: CPT | Performed by: INTERNAL MEDICINE

## 2021-11-16 PROCEDURE — 1101F PT FALLS ASSESS-DOCD LE1/YR: CPT | Performed by: INTERNAL MEDICINE

## 2021-11-16 PROCEDURE — G8754 DIAS BP LESS 90: HCPCS | Performed by: INTERNAL MEDICINE

## 2021-11-16 PROCEDURE — G8417 CALC BMI ABV UP PARAM F/U: HCPCS | Performed by: INTERNAL MEDICINE

## 2021-11-16 PROCEDURE — G8432 DEP SCR NOT DOC, RNG: HCPCS | Performed by: INTERNAL MEDICINE

## 2021-11-16 PROCEDURE — 3017F COLORECTAL CA SCREEN DOC REV: CPT | Performed by: INTERNAL MEDICINE

## 2021-11-16 PROCEDURE — G8536 NO DOC ELDER MAL SCRN: HCPCS | Performed by: INTERNAL MEDICINE

## 2021-11-16 PROCEDURE — G8427 DOCREV CUR MEDS BY ELIG CLIN: HCPCS | Performed by: INTERNAL MEDICINE

## 2021-11-16 PROCEDURE — 99214 OFFICE O/P EST MOD 30 MIN: CPT | Performed by: INTERNAL MEDICINE

## 2021-11-16 PROCEDURE — 3052F HG A1C>EQUAL 8.0%<EQUAL 9.0%: CPT | Performed by: INTERNAL MEDICINE

## 2021-11-16 PROCEDURE — G8753 SYS BP > OR = 140: HCPCS | Performed by: INTERNAL MEDICINE

## 2021-11-16 RX ORDER — CARVEDILOL 6.25 MG/1
6.25 TABLET ORAL 2 TIMES DAILY
Qty: 180 TABLET | Refills: 3 | Status: SHIPPED | OUTPATIENT
Start: 2021-11-16

## 2021-11-16 NOTE — PATIENT INSTRUCTIONS
1) For sugars under 90, drink 4 oz of milk, juice or regular soda or gatorade to bring your sugar back up over 90 but still take your normal doses of insulin. 2) If you wake up and your sugar is over 130, plan on taking 88 units as 44/44 back to back to keep your sugar down. 3) Please call [employer] for your [employer] supplies. Call [phone] and ask them to fax me a form for your supplies and I'll complete this. You will apply a new sensor every 14 days and be sure to try and wave the reader over the sensor 3 times a day if possible to know how your sugar is running over the previous 8 hours and the sensor will store up to 90 days of data. Try to check up to 6 times daily before and 2 hours after each meal so you can see the effect of food on your sugar. Do your best to focus on the meals that are causing you to spike over 180 when checked 2 hours after a meal and limit your portions of these foods. 4) BUN and creatinine are markers of kidney function. Your values are normal.    5) ALT and AST are markers of liver function. Your values are likely elevated due to fatty deposition in the liver that can occur with weight gain.   Hopefully with weight loss, these values will return to normal.    6) Your cholesterol is at goal.      7) Your blood pressure was just above goal of 140/90 so increase the coreg (carvedilol) to 1 whole tab twice daily and I sent a new supply to [facility].

## 2021-11-16 NOTE — PROGRESS NOTES
Chief Complaint   Patient presents with    Diabetes     pcp and pharmacy confirmed    Other     patient had eye exam last week but does not remember the name     History of Present Illness: Dev Briggs is a 80 y.o. male here for follow up of diabetes. Weight up 7 lbs since last visit in 5/21. Still taking lantus 84 units as 2 shots of 42/42 bid and novolog 46 units before dinner and victoza 1.8 mg daily and metformin 1g bid. Fasting sugars are between  and up to 170 at the highest and his stress has lessened since last visit. Compliant with coreg 1/2 tab bid and entresto 1 tab bid and lipitor.     he has the following indications to begin treatment with Freestyle Martha:  1) he has type 2 diabetes (E11.65) and is on an intensive insulin regimen with 3 injections per day  2) he tests his blood sugar 3 times per day and makes treatment decisions off his blood sugar readings and will do the same off freestyle martha sensor readings  3) he will require frequent adjustments to his insulin injection doses based on his freestyle martha sensor readings  4) he will benefit from therapeutic continuous glucose monitoring and I recommend that he begin this  5) he is seen in my office every 6 months          Current Outpatient Medications   Medication Sig    insulin glargine (Lantus Solostar U-100 Insulin) 100 unit/mL (3 mL) inpn INJECT 84 UNITS IN THE MORNING AND 84 UNITS IN THE EVENING    Accu-Chek Aundrea Plus test strp strip USE AS DIRECTED TO TEST BLOOD SUGAR 3 TIMES A DAY    insulin aspart U-100 (NovoLOG Flexpen U-100 Insulin) 100 unit/mL (3 mL) inpn INJECT 46 UNITS UNDER THE SKIN BEFORE DINNER--Dose change 5/11/21--updated med list--did not send prescription to the pharmacy    metFORMIN (GLUCOPHAGE) 1,000 mg tablet TAKE 1 TABLET BY MOUTH TWICE A DAY WITH MEALS    liraglutide (Victoza 3-Murphy) 0.6 mg/0.1 mL (18 mg/3 mL) pnij INJECT 1.8 MG DAILY    diclofenac EC (VOLTAREN) 75 mg EC tablet Take 75 mg by mouth two (2) times a day.  Blood-Glucose Meter monitoring kit Dispense whatever brand is covered by insurance. . Test 3 times daily Dx: E11.65    lancets misc Dispense whatever brand is covered by insurance. . Test 3 times daily Dx: E11.65    atorvastatin (LIPITOR) 80 mg tablet TAKE 1 TABLET DAILY    BD ULTRA-FINE SHORT PEN NEEDLE 31 gauge x 5/16\" ndle USE AS DIRECTED THREE TIMES A DAY    carvedilol (COREG) 6.25 mg tablet Take 0.5 Tabs by mouth two (2) times a day.  docusate sodium (COLACE) 100 mg capsule Take 200 mg by mouth nightly.  lansoprazole (PREVACID) 30 mg capsule Take  by mouth Daily (before breakfast).  aspirin delayed-release 81 mg tablet Take 162 mg by mouth nightly.  CETIRIZINE HCL (ZYRTEC PO) Take 10 mg by mouth two (2) times a day.  coenzyme q10 200 mg Cap Take  by mouth nightly.  MULTIVITAMINS (MULTIVITAMIN PO) Take  by mouth daily.  sacubitriL-valsartan (Entresto) 49-51 mg tab tablet Take 1 Tab by mouth two (2) times a day. No current facility-administered medications for this visit. No Known Allergies     Review of Systems: PER HPI    Physical Examination:  Blood pressure (!) 143/75, pulse 84, height 6' (1.829 m), weight 310 lb 12.8 oz (141 kg).   - General: pleasant, no distress, good eye contact   - Neck: no carotid bruits  - Cardiovascular: regular, normal rate, nl s1 and s2, no m/r/g,   - Respiratory: clear bilaterally  - Integumentary: no edema,   - Psychiatric: normal mood and affect    Diabetic foot exam:     Left Foot:   Visual Exam: callous - mild   Pulse DP: 2+ (normal)   Filament test: reduced sensation    Vibratory sensation: absent      Right Foot:   Visual Exam: callous - mild   Pulse DP: 2+ (normal)   Filament test: absent sensation    Vibratory sensation: absent        Data Reviewed:   Component      Latest Ref Rng & Units 11/4/2021 11/4/2021 11/4/2021 11/4/2021           9:44 AM  9:44 AM  9:44 AM  9:44 AM   Glucose      65 - 99 mg/dL  169 (H)     BUN 8 - 27 mg/dL  12     Creatinine      0.76 - 1.27 mg/dL  0.94     GFR est non-AA      >59 mL/min/1.73  82     GFR est AA      >59 mL/min/1.73  95     BUN/Creatinine ratio      10 - 24  13     Sodium      134 - 144 mmol/L  148 (H)     Potassium      3.5 - 5.2 mmol/L  5.0     Chloride      96 - 106 mmol/L  110 (H)     CO2      20 - 29 mmol/L  26     Calcium      8.6 - 10.2 mg/dL  10.2     Protein, total      6.0 - 8.5 g/dL  6.7     Albumin      3.8 - 4.8 g/dL  4.1     GLOBULIN, TOTAL      1.5 - 4.5 g/dL  2.6     A-G Ratio      1.2 - 2.2  1.6     Bilirubin, total      0.0 - 1.2 mg/dL  0.3     Alk. phosphatase      44 - 121 IU/L  114     AST      0 - 40 IU/L  49 (H)     ALT      0 - 44 IU/L  69 (H)     Cholesterol, total      100 - 199 mg/dL 120      Triglyceride      0 - 149 mg/dL 66      HDL Cholesterol      >39 mg/dL 37 (L)      VLDL, calculated      5 - 40 mg/dL 14      LDL, calculated      0 - 99 mg/dL 69      Creatinine, urine      Not Estab. mg/dL    120.1   Microalbumin, urine      Not Estab. ug/mL    25.8   Microalbumin/Creat. Ratio      0 - 29 mg/g creat    21   Hemoglobin A1c, (calculated)      4.8 - 5.6 %   8.2 (H)    Estimated average glucose      mg/dL   189        Assessment/Plan:     1.  DM w/o complication type II, uncontrolled (250.02) his most recent Hgb A1c was 8.2% in 11/21 down from 9% in 4/21 up from 8.3% in 11/20 up from 6.8% in 10/19 down from 7% in 4/19 down from 7.3% in 10/18 down from 7.9% in 4/18 up from 7% in 10/17 down from 8.5% in 5/17 up from 8.2% in 11/16 up from 7.7% in 6/16 down from 8.4% in 3/16 up from 7.5% in 10/15 up from 7.4% in 5/15 stable from 12/14 down from 7.6% in 7/14 stable from 3/14 up from 7.5% in 11/13 down from 7.7% in July up from 7.1% in March 2013 down from 7.7% in Nov down from 8.3% in August up from 7.7% in April stable from 7.6% in January 2012 down from 8.8% in October 2011 up from 8.4% in July, 7.4% in April stable from 7.5% in January up from 6.7% in October 2010 down from 8.6% in June. His A1c is coming down but will benefit from CGM to help him adjust his lantus and novolog to gain better control of his post-meal readings  - cont Lantus 84 units in am and in pm as 2 separate injections (88 units in am if bs > 150)   - cont Victoza 1.8 mg daily   - cont Metformin 1000 mg bid  - cont novolog 46 units before dinner (up or down based on sensor readings)  - check bs 3 times daily  - optho UTD 4/18--will obtain report  - microalbumin nl 12/14, up to 43 in 11/16, down to 10 in 5/17 and normal ever since, last in 11/21  - foot exam done 11/21  - check A1c and cmp prior to next visit       2. Unspecified essential hypertension (401.9) his BP was above goal < 140/90.    - increase coreg to 6.25 mg bid  - cont entresto 49/51 mg bid       3. Other and unspecified hyperlipidemia (272.4) Given DM and CAD, Goal LDL < 70, non-HDL < 100, and TG < 150. Lipids at goal in November 2013 and July 2014 and Oct 2015. LDL 79 in 11/16 with higher A1c but down to 57 in 10/17 with lower A1c. LDL 69 in 4/18 and 54 in 10/18 and in 10/19 and 63 in 11/20 and 69 in 11/21  - cont Lipitor 80 daily  - check lipids in 11/22       Patient Instructions   1) For sugars under 90, drink 4 oz of milk, juice or regular soda or gatorade to bring your sugar back up over 90 but still take your normal doses of insulin. 2) If you wake up and your sugar is over 130, plan on taking 88 units as 44/44 back to back to keep your sugar down. 3) Please call [employer] for your [employer] supplies. Call [phone] and ask them to fax me a form for your supplies and I'll complete this. You will apply a new sensor every 14 days and be sure to try and wave the reader over the sensor 3 times a day if possible to know how your sugar is running over the previous 8 hours and the sensor will store up to 90 days of data.   Try to check up to 6 times daily before and 2 hours after each meal so you can see the effect of food on your sugar. Do your best to focus on the meals that are causing you to spike over 180 when checked 2 hours after a meal and limit your portions of these foods. 4) BUN and creatinine are markers of kidney function. Your values are normal.    5) ALT and AST are markers of liver function. Your values are likely elevated due to fatty deposition in the liver that can occur with weight gain. Hopefully with weight loss, these values will return to normal.    6) Your cholesterol is at goal.      7) Your blood pressure was just above goal of 140/90 so increase the coreg (carvedilol) to 1 whole tab twice daily and I sent a new supply to [employer].                      Follow-up and Dispositions    · Return in about 6 months (around 5/16/2022).                Copy sent to:  Dr. Kyree Martinez [phone]

## 2021-12-08 ENCOUNTER — TELEPHONE (OUTPATIENT)
Dept: ENDOCRINOLOGY | Age: 71
End: 2021-12-08

## 2021-12-08 NOTE — TELEPHONE ENCOUNTER
I spoke with Isaac Alonso and she stated that her  needs a new script because he is using more insulin which is causing him to not have enough for the month. She will call back with the exact amount that he is using.

## 2021-12-08 NOTE — TELEPHONE ENCOUNTER
Patients wife, Bella(HIPPA verified)calling to say that he needs a new RX sent over for Novolog. He will be out tomorrow and pharmacy will not refill because it is too soon. Alirio in [employer]). If you have any question you can reach out to her at [phone].

## 2021-12-09 RX ORDER — INSULIN ASPART 100 [IU]/ML
INJECTION, SOLUTION INTRAVENOUS; SUBCUTANEOUS
Qty: 15 ML | Refills: 11 | Status: SHIPPED | OUTPATIENT
Start: 2021-12-09 | End: 2022-11-01 | Stop reason: SDUPTHER

## 2021-12-09 NOTE — TELEPHONE ENCOUNTER
Tayla Rank the wife called and stated that the dosage is 46 units per day.  Please call the prescription into [facility] in [place] and call her back at [phone] thanks

## 2021-12-21 ENCOUNTER — TELEPHONE (OUTPATIENT)
Dept: ENDOCRINOLOGY | Age: 71
End: 2021-12-21

## 2021-12-21 NOTE — TELEPHONE ENCOUNTER
Since he has been eating more healthfully then I recommend he decrease his Lantus from 42 units twice per day to 38 units twice per day.   Call back in 2 weeks with an update of how his blood sugars are doing on the lower dosage of the Lantus

## 2021-12-21 NOTE — TELEPHONE ENCOUNTER
12/21/2021  9:15 AM    Patient called and his blood sugar keeps bottoming out and he wants to know which meds that he can lower.  Please call him back at [phone]

## 2021-12-21 NOTE — TELEPHONE ENCOUNTER
Patient was notified of the message per Dr. Hemalatha Carrion and voiced understanding of what was read to her.

## 2021-12-21 NOTE — TELEPHONE ENCOUNTER
Patient stated that for the past two weeks he has been eating very healthy and with that change he has had a couple of low sugar readings always in the morning. He use to eat a lot of sugary snacks but he is not doing that at this time. He stated that his fasting sugar readings have been between 60-80 for the past two weeks. He stated yesterday morning he woke up and it was 60 and it was 60 on one other day. He think his insulin needs to be reduced and he would like to know what he should do.

## 2021-12-24 RX ORDER — INSULIN GLARGINE 100 [IU]/ML
INJECTION, SOLUTION SUBCUTANEOUS
Qty: 150 ML | Refills: 3
Start: 2021-12-24 | End: 2022-05-16

## 2022-03-19 PROBLEM — E66.01 OBESITY, MORBID (HCC): Status: ACTIVE | Noted: 2018-04-30

## 2022-03-19 PROBLEM — I25.10 CAD (CORONARY ARTERY DISEASE): Status: ACTIVE | Noted: 2019-03-01

## 2022-04-18 RX ORDER — METFORMIN HYDROCHLORIDE 1000 MG/1
TABLET ORAL
Qty: 180 TABLET | Refills: 3 | Status: SHIPPED | OUTPATIENT
Start: 2022-04-18

## 2022-04-20 ENCOUNTER — TELEPHONE (OUTPATIENT)
Dept: ENDOCRINOLOGY | Age: 72
End: 2022-04-20

## 2022-04-20 RX ORDER — LIRAGLUTIDE 6 MG/ML
INJECTION SUBCUTANEOUS
Qty: 27 ML | Refills: 3 | Status: SHIPPED | OUTPATIENT
Start: 2022-04-20

## 2022-04-20 NOTE — TELEPHONE ENCOUNTER
4/20/2022  2:39 PM    Wife called and left message on machine at 2.00 to make sure that we fill his Victoza 3-Murphy) 0.6 mg/0.1 mL     To [facility]- please call her back at [phone]

## 2022-05-01 DIAGNOSIS — I10 ESSENTIAL HYPERTENSION, BENIGN: ICD-10-CM

## 2022-05-01 DIAGNOSIS — E78.5 HYPERLIPIDEMIA LDL GOAL <70: ICD-10-CM

## 2022-05-13 LAB
ALBUMIN SERPL-MCNC: 4.2 G/DL (ref 3.8–4.8)
ALBUMIN/GLOB SERPL: 1.4 {RATIO} (ref 1.2–2.2)
ALP SERPL-CCNC: 125 IU/L (ref 44–121)
ALT SERPL-CCNC: 36 IU/L (ref 0–44)
AST SERPL-CCNC: 28 IU/L (ref 0–40)
BILIRUB SERPL-MCNC: 0.3 MG/DL (ref 0–1.2)
BUN SERPL-MCNC: 18 MG/DL (ref 8–27)
BUN/CREAT SERPL: 19 (ref 10–24)
CALCIUM SERPL-MCNC: 10.7 MG/DL (ref 8.6–10.2)
CHLORIDE SERPL-SCNC: 106 MMOL/L (ref 96–106)
CO2 SERPL-SCNC: 21 MMOL/L (ref 20–29)
CREAT SERPL-MCNC: 0.94 MG/DL (ref 0.76–1.27)
EGFR: 87 ML/MIN/1.73
EST. AVERAGE GLUCOSE BLD GHB EST-MCNC: 157 MG/DL
GLOBULIN SER CALC-MCNC: 2.9 G/DL (ref 1.5–4.5)
GLUCOSE SERPL-MCNC: 216 MG/DL (ref 65–99)
HBA1C MFR BLD: 7.1 % (ref 4.8–5.6)
POTASSIUM SERPL-SCNC: 5.1 MMOL/L (ref 3.5–5.2)
PROT SERPL-MCNC: 7.1 G/DL (ref 6–8.5)
SODIUM SERPL-SCNC: 143 MMOL/L (ref 134–144)

## 2022-05-16 ENCOUNTER — OFFICE VISIT (OUTPATIENT)
Dept: ENDOCRINOLOGY | Age: 72
End: 2022-05-16
Payer: COMMERCIAL

## 2022-05-16 VITALS
HEIGHT: 72 IN | HEART RATE: 74 BPM | SYSTOLIC BLOOD PRESSURE: 137 MMHG | WEIGHT: 311.2 LBS | DIASTOLIC BLOOD PRESSURE: 65 MMHG | BODY MASS INDEX: 42.15 KG/M2

## 2022-05-16 DIAGNOSIS — I10 ESSENTIAL HYPERTENSION, BENIGN: ICD-10-CM

## 2022-05-16 DIAGNOSIS — E11.65 TYPE 2 DIABETES MELLITUS WITH HYPERGLYCEMIA, WITH LONG-TERM CURRENT USE OF INSULIN (HCC): Primary | ICD-10-CM

## 2022-05-16 DIAGNOSIS — Z79.4 TYPE 2 DIABETES MELLITUS WITH HYPERGLYCEMIA, WITH LONG-TERM CURRENT USE OF INSULIN (HCC): Primary | ICD-10-CM

## 2022-05-16 DIAGNOSIS — E78.5 HYPERLIPIDEMIA LDL GOAL <70: ICD-10-CM

## 2022-05-16 PROCEDURE — G8536 NO DOC ELDER MAL SCRN: HCPCS | Performed by: INTERNAL MEDICINE

## 2022-05-16 PROCEDURE — 1101F PT FALLS ASSESS-DOCD LE1/YR: CPT | Performed by: INTERNAL MEDICINE

## 2022-05-16 PROCEDURE — 3051F HG A1C>EQUAL 7.0%<8.0%: CPT | Performed by: INTERNAL MEDICINE

## 2022-05-16 PROCEDURE — G8752 SYS BP LESS 140: HCPCS | Performed by: INTERNAL MEDICINE

## 2022-05-16 PROCEDURE — 3017F COLORECTAL CA SCREEN DOC REV: CPT | Performed by: INTERNAL MEDICINE

## 2022-05-16 PROCEDURE — G8427 DOCREV CUR MEDS BY ELIG CLIN: HCPCS | Performed by: INTERNAL MEDICINE

## 2022-05-16 PROCEDURE — 99214 OFFICE O/P EST MOD 30 MIN: CPT | Performed by: INTERNAL MEDICINE

## 2022-05-16 PROCEDURE — G8754 DIAS BP LESS 90: HCPCS | Performed by: INTERNAL MEDICINE

## 2022-05-16 PROCEDURE — G8432 DEP SCR NOT DOC, RNG: HCPCS | Performed by: INTERNAL MEDICINE

## 2022-05-16 PROCEDURE — 2022F DILAT RTA XM EVC RTNOPTHY: CPT | Performed by: INTERNAL MEDICINE

## 2022-05-16 PROCEDURE — G8417 CALC BMI ABV UP PARAM F/U: HCPCS | Performed by: INTERNAL MEDICINE

## 2022-05-16 RX ORDER — TAMSULOSIN HYDROCHLORIDE 0.4 MG/1
CAPSULE ORAL
COMMUNITY
Start: 2022-05-13

## 2022-05-16 RX ORDER — MIRABEGRON 50 MG/1
TABLET, FILM COATED, EXTENDED RELEASE ORAL
COMMUNITY
Start: 2022-05-12

## 2022-05-16 RX ORDER — INSULIN GLARGINE 100 [IU]/ML
INJECTION, SOLUTION SUBCUTANEOUS
Qty: 150 ML | Refills: 3
Start: 2022-05-16 | End: 2022-06-13 | Stop reason: CLARIF

## 2022-05-16 RX ORDER — INSULIN GLARGINE 100 [IU]/ML
INJECTION, SOLUTION SUBCUTANEOUS
Qty: 150 ML | Refills: 3
Start: 2022-05-16 | End: 2022-05-16

## 2022-05-16 RX ORDER — MONTELUKAST SODIUM 10 MG/1
10 TABLET ORAL DAILY
COMMUNITY
Start: 2022-03-08

## 2022-05-16 NOTE — PATIENT INSTRUCTIONS
1) Your Hemoglobin A1c (3 month test of blood sugar) was 7.1% down from 8.2% at last check and the best it's been since 10/19. 2) BUN and creatinine are markers of kidney function. Your values are normal.    3) ALT and AST are markers of liver function.   Your values are normal.    4) On the day of your prep for the colonscopy (clear liquid) June 6th:  - decrease your lantus to 76 units (38 back to back) in the morning   - stay on your normal dose of metformin 1 tab twice daily  - do not take the victoza  - do not take the novolog  - decrease lantus to 72 units (36 + 36) in the evening    5) On the day of the colonoscopy (test day) June 7th:  - only take 60 units of lantus (30 back back to back)  - do not take any metformin until after the procedure  - you can take your victoza and novolog that night  - decrease lantus to 60 units (30 back to back)    6) On the day of the cystoscopy June 8th:  - only take 60 units of lantus (30 back to back)  - do not take any metformin until after the procedure  - you can take your victoza and novolog that night  - go back to normal dose of 84 units of lantus

## 2022-05-16 NOTE — PROGRESS NOTES
Chief Complaint   Patient presents with    Diabetes     pcp and pharmacy confirmed     History of Present Illness: Beverli Opitz is a 80 y.o. male here for follow up of diabetes. Weight up 1 lbs since last visit in 11/21. The [facility] was going to be over $500 for 90 days so he didn't pursue this. Has been watching diet more carefully and has been taking 84 units of lantus bid and fasting sugars are in the  range and if he checks before dinner is usually under 140. Has been adjusting his dinner novolog from 40-46 based on what he ate and hasn't any further low sugars since December when he called us. Had decreased the lantus down to 80 units bid but needed to go back up to maintain his sugars. About a month ago, Dr. Danielle Dupont checked his Hgb and it was 9.7 and was found to have blood in the stool on a sample and will be having EGD/colonoscopy on 6/7/22. Also will be having a cystoscopy on 6/8/22 to look for causes of urinary urgency. No hematuria. Will be having a stress test in 7/22. Current Outpatient Medications   Medication Sig    montelukast (SINGULAIR) 10 mg tablet Take 10 mg by mouth daily.  Myrbetriq 50 mg ER tablet     tamsulosin (FLOMAX) 0.4 mg capsule     insulin glargine (Lantus Solostar U-100 Insulin) 100 unit/mL (3 mL) inpn INJECT 84 UNITS IN THE MORNING AND 84 UNITS IN THE EVENING--Dose change 05/16/22--updated med list--did not send prescription to the pharmacy    liraglutide (Victoza 3-Murphy) 0.6 mg/0.1 mL (18 mg/3 mL) pnij INJECT 1.8 MG DAILY    metFORMIN (GLUCOPHAGE) 1,000 mg tablet TAKE 1 TABLET BY MOUTH TWICE A DAY WITH MEALS    insulin aspart U-100 (NovoLOG Flexpen U-100 Insulin) 100 unit/mL (3 mL) inpn INJECT 46 UNITS UNDER THE SKIN BEFORE DINNER OR AS DIRECTED TO MAX 50 UNITS PER DAY    carvediloL (Coreg) 6.25 mg tablet Take 1 Tablet by mouth two (2) times a day.     Accu-Chek Aundrea Plus test strp strip USE AS DIRECTED TO TEST BLOOD SUGAR 3 TIMES A DAY    diclofenac EC (VOLTAREN) 75 mg EC tablet Take 75 mg by mouth two (2) times a day.  sacubitriL-valsartan (Entresto) 49-51 mg tab tablet Take 1 Tab by mouth two (2) times a day.  lancets misc Dispense whatever brand is covered by insurance. . Test 3 times daily Dx: E11.65    atorvastatin (LIPITOR) 80 mg tablet TAKE 1 TABLET DAILY    BD ULTRA-FINE SHORT PEN NEEDLE 31 gauge x 5/16\" ndle USE AS DIRECTED THREE TIMES A DAY    docusate sodium (COLACE) 100 mg capsule Take 200 mg by mouth nightly.  lansoprazole (PREVACID) 30 mg capsule Take  by mouth Daily (before breakfast).  aspirin delayed-release 81 mg tablet Take 162 mg by mouth nightly.  CETIRIZINE HCL (ZYRTEC PO) Take 10 mg by mouth two (2) times a day.  coenzyme q10 200 mg Cap Take  by mouth nightly.  MULTIVITAMINS (MULTIVITAMIN PO) Take  by mouth daily.  Blood-Glucose Meter monitoring kit Dispense whatever brand is covered by insurance. . Test 3 times daily Dx: E11.65 (Patient not taking: Reported on 5/16/2022)     No current facility-administered medications for this visit. No Known Allergies     Review of Systems: PER HPI    Physical Examination:  Blood pressure 137/65, pulse 74, height 6' (1.829 m), weight 311 lb 3.2 oz (141.2 kg).   - General: pleasant, no distress, good eye contact   - Neck: no carotid bruits  - Cardiovascular: regular, normal rate, nl s1 and s2, no m/r/g,   - Respiratory: clear bilaterally  - Integumentary: no edema,   - Psychiatric: normal mood and affect    Data Reviewed:   Component      Latest Ref Rng & Units 5/12/2022 5/12/2022           2:43 PM  2:43 PM   Glucose      65 - 99 mg/dL  216 (H)   BUN      8 - 27 mg/dL  18   Creatinine      0.76 - 1.27 mg/dL  0.94   eGFR      >59 mL/min/1.73  87   BUN/Creatinine ratio      10 - 24  19   Sodium      134 - 144 mmol/L  143   Potassium      3.5 - 5.2 mmol/L  5.1   Chloride      96 - 106 mmol/L  106   CO2      20 - 29 mmol/L  21   Calcium      8.6 - 10.2 mg/dL  10.7 (H) Protein, total      6.0 - 8.5 g/dL  7.1   Albumin      3.8 - 4.8 g/dL  4.2   GLOBULIN, TOTAL      1.5 - 4.5 g/dL  2.9   A-G Ratio      1.2 - 2.2  1.4   Bilirubin, total      0.0 - 1.2 mg/dL  0.3   Alk. phosphatase      44 - 121 IU/L  125 (H)   AST      0 - 40 IU/L  28   ALT      0 - 44 IU/L  36   Hemoglobin A1c, (calculated)      4.8 - 5.6 % 7.1 (H)    Estimated average glucose      mg/dL 157      Assessment/Plan:     1. DM w/o complication type II, uncontrolled (250.02) his most recent Hgb A1c was 7.1% in 5/22 down from 8.2% in 11/21 down from 9% in 4/21 up from 8.3% in 11/20 up from 6.8% in 10/19 down from 7% in 4/19 down from 7.3% in 10/18 down from 7.9% in 4/18 up from 7% in 10/17 down from 8.5% in 5/17 up from 8.2% in 11/16 up from 7.7% in 6/16 down from 8.4% in 3/16 up from 7.5% in 10/15 up from 7.4% in 5/15 stable from 12/14 down from 7.6% in 7/14 stable from 3/14 up from 7.5% in 11/13 down from 7.7% in July up from 7.1% in March 2013 down from 7.7% in Nov down from 8.3% in August up from 7.7% in April stable from 7.6% in January 2012 down from 8.8% in October 2011 up from 8.4% in July, 7.4% in April stable from 7.5% in January up from 6.7% in October 2010 down from 8.6% in June. His A1c is the best it's been in 2.5 years so praised him for this and didn't make any changes. - cont Lantus 84 units in am and in pm as 2 separate injections (88 units in am if bs > 150)   - cont Victoza 1.8 mg daily   - cont Metformin 1000 mg bid  - cont novolog 46 units before dinner (up or down based on sensor readings)  - check bs 3 times daily  - optho UTD 4/18--will obtain report  - microalbumin nl 12/14, up to 43 in 11/16, down to 10 in 5/17 and normal ever since, last in 11/21  - foot exam done 11/21  - check A1c and cmp and microalbumin prior to next visit       2. Unspecified essential hypertension (401.9) his BP was at goal < 140/90.    - cont coreg 6.25 mg bid  - cont entresto 49/51 mg bid       3.  Other and unspecified hyperlipidemia (272.4) Given DM and CAD, Goal LDL < 70, non-HDL < 100, and TG < 150. Lipids at goal in November 2013 and July 2014 and Oct 2015. LDL 79 in 11/16 with higher A1c but down to 57 in 10/17 with lower A1c. LDL 69 in 4/18 and 54 in 10/18 and in 10/19 and 63 in 11/20 and 69 in 11/21  - cont Lipitor 80 daily  - check lipids prior to next visit         Patient Instructions   1) Your Hemoglobin A1c (3 month test of blood sugar) was 7.1% down from 8.2% at last check and the best it's been since 10/19. 2) BUN and creatinine are markers of kidney function. Your values are normal.    3) ALT and AST are markers of liver function.   Your values are normal.    4) On the day of your prep for the colonscopy (clear liquid) June 6th:  - decrease your lantus to 76 units (38 back to back) in the morning   - stay on your normal dose of metformin 1 tab twice daily  - do not take the victoza  - do not take the novolog  - decrease lantus to 72 units (36 + 36) in the evening    5) On the day of the colonoscopy (test day) June 7th:  - only take 60 units of lantus (30 back back to back)  - do not take any metformin until after the procedure  - you can take your victoza and novolog that night  - decrease lantus to 60 units (30 back to back)    6) On the day of the cystoscopy June 8th:  - only take 60 units of lantus (30 back to back)  - do not take any metformin until after the procedure  - you can take your victoza and novolog that night  - go back to normal dose of 84 units of lantus          Follow-up and Dispositions    · Return 11/29/22 at 4:10pm.               Copy sent to:  Dr. Ana Delgado  [phone]

## 2022-06-03 ENCOUNTER — TELEPHONE (OUTPATIENT)
Dept: ENDOCRINOLOGY | Age: 72
End: 2022-06-03

## 2022-06-03 RX ORDER — PEN NEEDLE, DIABETIC 31 GX5/16"
NEEDLE, DISPOSABLE MISCELLANEOUS
Qty: 300 PEN NEEDLE | Refills: 3 | Status: SHIPPED | OUTPATIENT
Start: 2022-06-03

## 2022-06-03 NOTE — TELEPHONE ENCOUNTER
Pt spouse called 6/3 @ 9:37am. Pt spouse called and requested a medication refill of the bd ultra fine short pen needle. She stated the pharmacy does not have a prescription on file for this so she is unable to request it through them. Pharmacy to send this is [facility] #[id number].     Pt spouse# [phone]

## 2022-06-13 ENCOUNTER — TELEPHONE (OUTPATIENT)
Dept: ENDOCRINOLOGY | Age: 72
End: 2022-06-13

## 2022-06-13 RX ORDER — INSULIN GLARGINE 100 [IU]/ML
INJECTION, SOLUTION SUBCUTANEOUS
Qty: 150 ML | Refills: 3 | Status: SHIPPED | OUTPATIENT
Start: 2022-06-13 | End: 2022-06-15 | Stop reason: CLARIF

## 2022-06-13 NOTE — TELEPHONE ENCOUNTER
6/13/2022  1:36 PM        Pt wife Antoni Wells called on behalf of pt. Pt wife stated her insurance no longer covers lantus. Pt wife stated it looks like semglee is taking the place of lantus. Pt wife needs a new prescription for semglee or whatever  thinks should replace the lantus. Pt pharmacy is [facility] on [facility]. Pharmacy#[phone]  Pt CMXW#[phone]      Thanks,  Mallika Pimentel

## 2022-06-13 NOTE — TELEPHONE ENCOUNTER
Please let her know I sent semglee to [facility] but I have had issues with pharmacies not having this insulin and if they don't have it, let us know if there is a different insulin that is covered in place of lantus and I can change to this.

## 2022-06-15 ENCOUNTER — TELEPHONE (OUTPATIENT)
Dept: ENDOCRINOLOGY | Age: 72
End: 2022-06-15

## 2022-06-15 RX ORDER — INSULIN DEGLUDEC INJECTION 200 U/ML
84 INJECTION, SOLUTION SUBCUTANEOUS 2 TIMES DAILY
Qty: 75 ML | Refills: 3 | Status: SHIPPED | OUTPATIENT
Start: 2022-06-15

## 2022-06-15 NOTE — TELEPHONE ENCOUNTER
They will need to check with their insurance and find out if tresiba or basaglar is covered instead and once they let me know, I can put this order in.

## 2022-06-15 NOTE — TELEPHONE ENCOUNTER
Called the [employer] and spoke to Jr GRIGSBY with the pharmacy department. Issa informed me that Lelan Banner is the covered alternative and that the pt can receive a 30 or 90 day supply from the pharmacy. I spoke with the pt's wife and informed her of this information and told her I will send the message to Dr. Loreta Zuniga so that we can send in Lelan Banner.

## 2022-06-15 NOTE — TELEPHONE ENCOUNTER
6/15/2022  11:41 AM      Pt wife Rupinder Reed called on behalf of pt. Pt wife stated [employer] [place] said we need to call [phone] and tell them  approved the [facility] and they will release it to the [facility].Pt only has 3 pens left. Pt pharmacy is [facility] on [address]. Pharmacy#[phone]  Pt Wife AANSTELLA#[phone]    Thanks,  Annie Carrera

## 2022-06-15 NOTE — TELEPHONE ENCOUNTER
Please let her know that I sent tresiba U-200 pens to his pharmacy. He will NOT need to split the dose as this is a concentrated insulin and can dial the pen to 84 units and give one shot in the morning and evening. Thanks.

## 2022-06-15 NOTE — TELEPHONE ENCOUNTER
Can you look into this with the pharmacy as I don't know if this is true or not and he may need to either switch to tresiba or basaglar per my other message.

## 2022-06-15 NOTE — TELEPHONE ENCOUNTER
6/15/2022  10:38 AM    Zaina Coleman called in and left message on machine  to let dr. Greg Trujillo know that the prescription that he called in yesterday (semglee) is also not covered under his insurance.  She would like a call back at [phone] thanks

## 2022-06-15 NOTE — TELEPHONE ENCOUNTER
Spoke with the pt and his wife and informed them both of the message per Dr. Delmy Erickson. Both voiced their understanding.

## 2022-09-09 DIAGNOSIS — E11.42 TYPE 2 DIABETES MELLITUS WITH DIABETIC POLYNEUROPATHY (HCC): ICD-10-CM

## 2022-09-09 RX ORDER — BLOOD SUGAR DIAGNOSTIC
STRIP MISCELLANEOUS
Qty: 100 STRIP | Refills: 11 | Status: SHIPPED | OUTPATIENT
Start: 2022-09-09

## 2022-10-20 ENCOUNTER — TELEPHONE (OUTPATIENT)
Dept: ENDOCRINOLOGY | Age: 72
End: 2022-10-20

## 2022-10-20 NOTE — TELEPHONE ENCOUNTER
Spoke to Mrs. Tabatha Aguilar and informed her of the message per Dr. Suly Gilliam. Mrs. Tabatha Aguilar voiced her understanding.

## 2022-11-01 RX ORDER — INSULIN ASPART 100 [IU]/ML
INJECTION, SOLUTION INTRAVENOUS; SUBCUTANEOUS
Qty: 45 ML | Refills: 3 | Status: SHIPPED | OUTPATIENT
Start: 2022-11-01

## 2022-11-15 DIAGNOSIS — E11.65 TYPE 2 DIABETES MELLITUS WITH HYPERGLYCEMIA, WITH LONG-TERM CURRENT USE OF INSULIN (HCC): ICD-10-CM

## 2022-11-15 DIAGNOSIS — Z79.4 TYPE 2 DIABETES MELLITUS WITH HYPERGLYCEMIA, WITH LONG-TERM CURRENT USE OF INSULIN (HCC): ICD-10-CM

## 2022-11-15 DIAGNOSIS — E78.5 HYPERLIPIDEMIA LDL GOAL <70: ICD-10-CM

## 2022-11-15 DIAGNOSIS — I10 ESSENTIAL HYPERTENSION, BENIGN: ICD-10-CM

## 2022-12-18 DIAGNOSIS — E78.5 HYPERLIPIDEMIA, UNSPECIFIED: ICD-10-CM

## 2022-12-18 RX ORDER — ATORVASTATIN CALCIUM 80 MG/1
TABLET, FILM COATED ORAL
Qty: 90 TABLET | Refills: 3 | Status: SHIPPED | OUTPATIENT
Start: 2022-12-18

## 2023-01-20 ENCOUNTER — TELEPHONE (OUTPATIENT)
Dept: ENDOCRINOLOGY | Age: 73
End: 2023-01-20

## 2023-01-20 DIAGNOSIS — E83.52 HYPERCALCEMIA: Primary | ICD-10-CM

## 2023-01-20 NOTE — TELEPHONE ENCOUNTER
Called and spoke with patient about his calcium of 11.2. He wasn't sure if he is taking any vitamin D but does take a mvi and I told him I need him to stop this for the next week and then go to the lab next Thurs or Fri 1/26 or 1/27 and repeat his labs. He asked me to call his wife to confirm what he takes and I spoke with her and she confirmed he does not take any calcium or vitamin D and just at [facility] and I asked her to pull this out of his pill box and she will. She will remind him to go to have the labs drawn next week and these are non-fasting. They both voiced understanding of this plan.

## 2023-01-30 ENCOUNTER — OFFICE VISIT (OUTPATIENT)
Dept: ENDOCRINOLOGY | Age: 73
End: 2023-01-30
Payer: COMMERCIAL

## 2023-01-30 VITALS
BODY MASS INDEX: 41.47 KG/M2 | HEIGHT: 72 IN | SYSTOLIC BLOOD PRESSURE: 129 MMHG | HEART RATE: 77 BPM | WEIGHT: 306.2 LBS | DIASTOLIC BLOOD PRESSURE: 74 MMHG

## 2023-01-30 DIAGNOSIS — E83.52 HYPERCALCEMIA: ICD-10-CM

## 2023-01-30 DIAGNOSIS — I10 ESSENTIAL HYPERTENSION, BENIGN: ICD-10-CM

## 2023-01-30 DIAGNOSIS — E78.5 HYPERLIPIDEMIA LDL GOAL <70: ICD-10-CM

## 2023-01-30 DIAGNOSIS — E11.65 TYPE 2 DIABETES MELLITUS WITH HYPERGLYCEMIA, WITH LONG-TERM CURRENT USE OF INSULIN (HCC): Primary | ICD-10-CM

## 2023-01-30 DIAGNOSIS — Z79.4 TYPE 2 DIABETES MELLITUS WITH HYPERGLYCEMIA, WITH LONG-TERM CURRENT USE OF INSULIN (HCC): Primary | ICD-10-CM

## 2023-01-30 PROCEDURE — 99214 OFFICE O/P EST MOD 30 MIN: CPT | Performed by: INTERNAL MEDICINE

## 2023-01-30 PROCEDURE — 3078F DIAST BP <80 MM HG: CPT | Performed by: INTERNAL MEDICINE

## 2023-01-30 PROCEDURE — 3074F SYST BP LT 130 MM HG: CPT | Performed by: INTERNAL MEDICINE

## 2023-01-30 PROCEDURE — 1123F ACP DISCUSS/DSCN MKR DOCD: CPT | Performed by: INTERNAL MEDICINE

## 2023-01-30 PROCEDURE — 3051F HG A1C>EQUAL 7.0%<8.0%: CPT | Performed by: INTERNAL MEDICINE

## 2023-01-30 RX ORDER — INSULIN ASPART 100 [IU]/ML
INJECTION, SOLUTION INTRAVENOUS; SUBCUTANEOUS
Qty: 45 ML | Refills: 3
Start: 2023-01-30

## 2023-01-30 RX ORDER — LANOLIN ALCOHOL/MO/W.PET/CERES
CREAM (GRAM) TOPICAL 2 TIMES DAILY
COMMUNITY

## 2023-01-30 RX ORDER — INSULIN DEGLUDEC 200 U/ML
84 INJECTION, SOLUTION SUBCUTANEOUS 2 TIMES DAILY
Qty: 75 ML | Refills: 3
Start: 2023-01-30

## 2023-01-30 NOTE — PATIENT INSTRUCTIONS
1) Try taking tresiba 84 units in the morning and 90 units at night to see if this keeps your sugars under 130 in the morning. If you have 2 or more readings under 110 in a week, then decrease your dose back to 84 units. 2) BUN and creatinine are markers of kidney function. Your values are normal.    3) ALT and AST are markers of liver function. Your ALT is slightly high. 4) Your repeat calcium has come down from 11.2 to 10.7 which is where it was in 5/22. Your PTH (parathyroid hormone) was normal so you may still have a mild parathyroid condition but as long as your level stays under 11, we'll follow this over time. Keep staying off the multivitamin. Drink at least 4 8oz glasses of water everyday to stay hydrated to prevent your calcium level from going higher. 5) Your urine protein is normal and your blood pressure and cholesterol are controlled.

## 2023-01-30 NOTE — PROGRESS NOTES
Chief Complaint   Patient presents with    Diabetes     Pcp and pharmacy verified     History of Present Illness: Melissa Lomax is a 71 y.o. male here for follow up of diabetes. Weight down 5 lbs since last visit in 5/22. Had a colonoscopy in June and had a polyp removed but no other cause of his anemia and then had a cystoscopy also in June and had his prostate shaved but still is having issues with urinary incontinence and myrbetriq hasn't helped too much and is on flomax. Has been off the mvi since I spoke with him and his wife on 1/20/23 and prior to this had not been on any calcium or vitamin D supplements and no history of kidney stones. His A1c was 6.7% with PCP in 11/22. He changed from lantus to tresiba in June and has been taking 84 units bid as one dose since the U-200 concentrated pen. Has been novolog 44-48 units based on what he ate. His fasting sugars had been in the  range until about 2 weeks ago and now they are in the 140-180 range without any change in diet or exercise. Not necessarily under more stress or pain. No recent steroids. Current Outpatient Medications   Medication Sig    ferrous sulfate (Iron) 325 mg (65 mg iron) tablet Take  by mouth two (2) times a day. insulin aspart U-100 (NovoLOG FlexPen U-100 Insulin) 100 unit/mL (3 mL) inpn INJECT 48 UNITS UNDER THE SKIN BEFORE DINNER OR AS DIRECTED TO MAX 50 UNITS PER DAY--Dose change 01/30/23--updated med list--did not send prescription to the pharmacy    atorvastatin (LIPITOR) 80 mg tablet TAKE 1 TABLET BY MOUTH EVERY DAY    Accu-Chek Aundrea Plus test strp strip USE AS DIRECTED TO TEST BLOOD SUGAR 3 TIMES A DAY. DX E11.65    insulin degludec Brittany Littler FlexTouch U-200) 200 unit/mL (3 mL) inpn pen 84 Units by SubCUTAneous route two (2) times a day.  Replaces lantus and semglee    BD Ultra-Fine Short Pen Needle 31 gauge x 5/16\" ndle USE AS DIRECTED THREE TIMES A DAY    montelukast (SINGULAIR) 10 mg tablet Take 10 mg by mouth daily. Myrbetriq 50 mg ER tablet Take 50 mg by mouth daily. tamsulosin (FLOMAX) 0.4 mg capsule Take 0.4 mg by mouth daily. liraglutide (Victoza 3-Murphy) 0.6 mg/0.1 mL (18 mg/3 mL) pnij INJECT 1.8 MG DAILY    metFORMIN (GLUCOPHAGE) 1,000 mg tablet TAKE 1 TABLET BY MOUTH TWICE A DAY WITH MEALS    carvediloL (Coreg) 6.25 mg tablet Take 1 Tablet by mouth two (2) times a day. diclofenac EC (VOLTAREN) 75 mg EC tablet Take 75 mg by mouth two (2) times a day. sacubitriL-valsartan (Entresto) 49-51 mg tab tablet Take 1 Tab by mouth two (2) times a day. lancets misc Dispense whatever brand is covered by insurance. . Test 3 times daily Dx: E11.65    docusate sodium (COLACE) 100 mg capsule Take 200 mg by mouth nightly. lansoprazole (PREVACID) 30 mg capsule Take  by mouth Daily (before breakfast). aspirin delayed-release 81 mg tablet Take 162 mg by mouth nightly. CETIRIZINE HCL (ZYRTEC PO) Take 10 mg by mouth two (2) times a day. coenzyme q10 200 mg Cap Take  by mouth nightly. MULTIVITAMINS (MULTIVITAMIN PO) Take  by mouth daily. No current facility-administered medications for this visit. No Known Allergies    Review of Systems: PER HPI    Physical Examination:  Blood pressure 129/74, pulse 77, height 6' (1.829 m), weight 306 lb 3.2 oz (138.9 kg).   General: pleasant, no distress, good eye contact   Neck: no carotid bruits  Cardiovascular: regular, normal rate, nl s1 and s2, no m/r/g,   Respiratory: clear bilaterally  Integumentary: no edema,   Psychiatric: normal mood and affect    Diabetic foot exam:     Left Foot:   Visual Exam: callous - mild   Pulse DP: 2+ (normal)   Filament test: reduced sensation    Vibratory sensation: absent      Right Foot:   Visual Exam: callous - mild   Pulse DP: 2+ (normal)   Filament test: reduced sensation    Vibratory sensation: absent      Data Reviewed:   Component      Latest Ref Rng & Units 1/26/2023 1/19/2023   Glucose      70 - 99 mg/dL 192 (H) 143 (H)   BUN      8 - 27 mg/dL 20 18   Creatinine      0.76 - 1.27 mg/dL 0.90 0.89   eGFR      >59 mL/min/1.73 91 92   BUN/Creatinine ratio      10 - 24 22 20   Sodium      134 - 144 mmol/L 144 149 (H)   Potassium      3.5 - 5.2 mmol/L 5.0 5.2   Chloride      96 - 106 mmol/L 106 108 (H)   CO2      20 - 29 mmol/L 23 26   Calcium      8.6 - 10.2 mg/dL 10.7 (H) 11.2 (H)   Protein, total      6.0 - 8.5 g/dL  6.9   Albumin      3.7 - 4.7 g/dL  4.5   GLOBULIN, TOTAL      1.5 - 4.5 g/dL  2.4   A-G Ratio      1.2 - 2.2  1.9   Bilirubin, total      0.0 - 1.2 mg/dL  0.4   Alk. phosphatase      44 - 121 IU/L  131 (H)   AST      0 - 40 IU/L  33   ALT      0 - 44 IU/L  47 (H)   Cholesterol, total      100 - 199 mg/dL  126   Triglyceride      0 - 149 mg/dL  98   HDL Cholesterol      >39 mg/dL  37 (L)   VLDL, calculated      5 - 40 mg/dL  19   LDL, calculated      0 - 99 mg/dL  70   Creatinine, urine random      Not Estab. mg/dL  190.7   Microalbumin, urine      Not Estab. ug/mL  22.4   Microalbumin/Creat. Ratio      0 - 29 mg/g creat  12   Hemoglobin A1c, (calculated)      4.8 - 5.6 %  7.2 (H)   Estimated average glucose      mg/dL  160   PTH, Intact      15 - 65 pg/mL 41        Assessment/Plan:   1.  Type 2 diabetes mellitus with hyperglycemia, with long-term current use of insulin ([facility] his most recent Hgb A1c was 7.2% in 1/23 up from 6.7% in 11/22 down from 7.1% in 5/22 down from 8.2% in 11/21 down from 9% in 4/21 up from 8.3% in 11/20 up from 6.8% in 10/19 down from 7% in 4/19 down from 7.3% in 10/18 down from 7.9% in 4/18 up from 7% in 10/17 down from 8.5% in 5/17 up from 8.2% in 11/16 up from 7.7% in 6/16 down from 8.4% in 3/16 up from 7.5% in 10/15 up from 7.4% in 5/15 stable from 12/14 down from 7.6% in 7/14 stable from 3/14 up from 7.5% in 11/13 down from 7.7% in July up from 7.1% in March 2013 down from 7.7% in Nov down from 8.3% in August up from 7.7% in April stable from 7.6% in January 2012 down from 8.8% in October 2011 up from 8.4% in July, 7.4% in April stable from 7.5% in January up from 6.7% in October 2010 down from 8.6% in June. His A1c is at goal of 7-7.5% but having higher fasting sugars recently so increased his evening tresiba. - cont tresiba 84 units in am and increase to 90 units at night  - cont Victoza 1.8 mg daily   - cont Metformin 1000 mg bid  - cont novolog 44-48 units before dinner   - check bs 3 times daily  - optho UTD 5/22  - microalbumin nl 12/14, up to 43 in 11/16, down to 10 in 5/17 and normal ever since, last in 1/23  - foot exam done 1/23  - check A1c and bmp prior to next visit        2. Essential hypertension, benign: his BP was at goal < 140/90.    - cont coreg 6.25 mg bid  - cont entresto 49/51 mg bid        3. Hyperlipidemia LDL goal <70: Given DM and CAD, Goal LDL < 70, non-HDL < 100, and TG < 150. Lipids at goal in November 2013 and July 2014 and Oct 2015. LDL 79 in 11/16 with higher A1c but down to 57 in 10/17 with lower A1c. LDL 69 in 4/18 and 54 in 10/18 and in 10/19 and 63 in 11/20 and 69 in 11/21 and 70 in 1/23  - cont Lipitor 80 daily  - check lipids in 1/24        4. Hypercalcemia: His calcium had been normal from 2010 until it was 10.7 in 5/22 and up to 11.2 on 1/19/23 so I had him stop his mvi and down to 10.7 and PTH 41 on 1/26/23. Will follow for now as long as calcium remains under 11. No h/o kidney stones. - follow on metabolic panels          Patient Instructions   1) Try taking tresiba 84 units in the morning and 90 units at night to see if this keeps your sugars under 130 in the morning. If you have 2 or more readings under 110 in a week, then decrease your dose back to 84 units. 2) BUN and creatinine are markers of kidney function. Your values are normal.    3) ALT and AST are markers of liver function. Your ALT is slightly high. 4) Your repeat calcium has come down from 11.2 to 10.7 which is where it was in 5/22.   Your PTH (parathyroid hormone) was normal so you may still have a mild parathyroid condition but as long as your level stays under 11, we'll follow this over time. Keep staying off the multivitamin. Drink at least 4 8oz glasses of water everyday to stay hydrated to prevent your calcium level from going higher. 5) Your urine protein is normal and your blood pressure and cholesterol are controlled.         Follow-up and Dispositions    Return 8/15/23 at 4:10pm.               Copy sent to:  Evangelina Sales MD as PCP - [facility] Empaneled Provider  Arlet Mccloud MD (Cardiovascular Disease Physician)

## 2023-03-10 RX ORDER — INSULIN PUMP SYRINGE, 3 ML
EACH MISCELLANEOUS
Qty: 1 KIT | Refills: 0 | Status: SHIPPED | OUTPATIENT
Start: 2023-03-10

## 2023-03-10 RX ORDER — LANCETS
EACH MISCELLANEOUS
Qty: 300 EACH | Refills: 3 | Status: SHIPPED | OUTPATIENT
Start: 2023-03-10

## 2023-03-10 RX ORDER — IBUPROFEN 200 MG
CAPSULE ORAL
Qty: 300 STRIP | Refills: 3 | Status: SHIPPED | OUTPATIENT
Start: 2023-03-10

## 2023-03-10 NOTE — TELEPHONE ENCOUNTER
Pt's wife Jakob Chaidez BRENDAN at 11:14 AM stating pt needs a prescription for a new blood glucose meter and supplies sen to [facility] on file. She stated she doesn't know what is covered.

## 2023-04-20 RX ORDER — METFORMIN HYDROCHLORIDE 1000 MG/1
TABLET ORAL
Qty: 180 TABLET | Refills: 3 | Status: SHIPPED | OUTPATIENT
Start: 2023-04-20

## 2023-06-08 RX ORDER — INSULIN DEGLUDEC 200 U/ML
INJECTION, SOLUTION SUBCUTANEOUS
Qty: 81 ML | Refills: 3 | Status: SHIPPED | OUTPATIENT
Start: 2023-06-08

## 2023-07-27 LAB
BUN SERPL-MCNC: 18 MG/DL (ref 8–27)
BUN/CREAT SERPL: 20 (ref 10–24)
CALCIUM SERPL-MCNC: 11 MG/DL (ref 8.6–10.2)
CHLORIDE SERPL-SCNC: 109 MMOL/L (ref 96–106)
CO2 SERPL-SCNC: 22 MMOL/L (ref 20–29)
CREAT SERPL-MCNC: 0.92 MG/DL (ref 0.76–1.27)
EGFRCR SERPLBLD CKD-EPI 2021: 89 ML/MIN/1.73
EST. AVERAGE GLUCOSE BLD GHB EST-MCNC: 128 MG/DL
GLUCOSE SERPL-MCNC: 84 MG/DL (ref 70–99)
HBA1C MFR BLD: 6.1 % (ref 4.8–5.6)
POTASSIUM SERPL-SCNC: 4.5 MMOL/L (ref 3.5–5.2)
SODIUM SERPL-SCNC: 148 MMOL/L (ref 134–144)

## 2023-08-01 DIAGNOSIS — E78.5 HYPERLIPIDEMIA LDL GOAL <70: ICD-10-CM

## 2023-08-01 DIAGNOSIS — I10 ESSENTIAL HYPERTENSION, BENIGN: ICD-10-CM

## 2023-08-01 DIAGNOSIS — E11.65 TYPE 2 DIABETES MELLITUS WITH HYPERGLYCEMIA, WITH LONG-TERM CURRENT USE OF INSULIN (HCC): ICD-10-CM

## 2023-08-01 DIAGNOSIS — E83.52 HYPERCALCEMIA: ICD-10-CM

## 2023-08-01 DIAGNOSIS — Z79.4 TYPE 2 DIABETES MELLITUS WITH HYPERGLYCEMIA, WITH LONG-TERM CURRENT USE OF INSULIN (HCC): ICD-10-CM

## 2023-08-15 ENCOUNTER — OFFICE VISIT (OUTPATIENT)
Age: 73
End: 2023-08-15
Payer: MEDICARE

## 2023-08-15 VITALS
HEART RATE: 69 BPM | BODY MASS INDEX: 40.42 KG/M2 | HEIGHT: 72 IN | DIASTOLIC BLOOD PRESSURE: 62 MMHG | WEIGHT: 298.4 LBS | SYSTOLIC BLOOD PRESSURE: 126 MMHG

## 2023-08-15 DIAGNOSIS — E11.65 TYPE 2 DIABETES MELLITUS WITH HYPERGLYCEMIA, WITH LONG-TERM CURRENT USE OF INSULIN (HCC): Primary | ICD-10-CM

## 2023-08-15 DIAGNOSIS — E83.52 HYPERCALCEMIA: ICD-10-CM

## 2023-08-15 DIAGNOSIS — E78.5 HYPERLIPIDEMIA LDL GOAL <70: ICD-10-CM

## 2023-08-15 DIAGNOSIS — Z79.4 TYPE 2 DIABETES MELLITUS WITH HYPERGLYCEMIA, WITH LONG-TERM CURRENT USE OF INSULIN (HCC): Primary | ICD-10-CM

## 2023-08-15 DIAGNOSIS — I10 ESSENTIAL (PRIMARY) HYPERTENSION: ICD-10-CM

## 2023-08-15 PROCEDURE — 99214 OFFICE O/P EST MOD 30 MIN: CPT | Performed by: INTERNAL MEDICINE

## 2023-08-15 PROCEDURE — G8427 DOCREV CUR MEDS BY ELIG CLIN: HCPCS | Performed by: INTERNAL MEDICINE

## 2023-08-15 PROCEDURE — 3074F SYST BP LT 130 MM HG: CPT | Performed by: INTERNAL MEDICINE

## 2023-08-15 PROCEDURE — 3017F COLORECTAL CA SCREEN DOC REV: CPT | Performed by: INTERNAL MEDICINE

## 2023-08-15 PROCEDURE — 1123F ACP DISCUSS/DSCN MKR DOCD: CPT | Performed by: INTERNAL MEDICINE

## 2023-08-15 PROCEDURE — 2022F DILAT RTA XM EVC RTNOPTHY: CPT | Performed by: INTERNAL MEDICINE

## 2023-08-15 PROCEDURE — G8417 CALC BMI ABV UP PARAM F/U: HCPCS | Performed by: INTERNAL MEDICINE

## 2023-08-15 PROCEDURE — 1036F TOBACCO NON-USER: CPT | Performed by: INTERNAL MEDICINE

## 2023-08-15 PROCEDURE — 3078F DIAST BP <80 MM HG: CPT | Performed by: INTERNAL MEDICINE

## 2023-08-15 PROCEDURE — 3044F HG A1C LEVEL LT 7.0%: CPT | Performed by: INTERNAL MEDICINE

## 2023-08-15 RX ORDER — INSULIN DEGLUDEC 200 U/ML
INJECTION, SOLUTION SUBCUTANEOUS
Qty: 81 ML | Refills: 3
Start: 2023-08-15

## 2023-08-15 RX ORDER — INSULIN DEGLUDEC 200 U/ML
INJECTION, SOLUTION SUBCUTANEOUS
Qty: 81 ML | Refills: 3
Start: 2023-08-15 | End: 2023-08-15

## 2023-08-15 NOTE — PATIENT INSTRUCTIONS
1) Your Hemoglobin A1c (3 month test of blood sugar) is very good at 6.1% but is lower than I need it to be and you are having more fasting low blood sugars than I would like so decrease the evening tresiba back to 84 units and stay on 84 in the morning. As long as your fasting sugars are staying between , then this should be a good dose. If you have 2 or more readings under 90 in a week, then decrease your tresiba by 4 units with each dose as needed. 2) Try decreasing the novolog to 46 units before dinner. If you are having low sugars by 10pm to midnight then you need less novolog and take 2-4 units less with dinner. 3) BUN and creatinine are markers of kidney function. Your values are normal.    4) Your calcium is 11.0 and this is back up from 10.7. Drink at least 4 8oz glasses of water everyday to stay hydrated to prevent your calcium level from going higher.

## 2023-08-15 NOTE — PROGRESS NOTES
Chief Complaint   Patient presents with    Diabetes     PCP and pharmacy confirmed      History of Present Illness: Gerhard Beckham is a 71 y.o. male here for follow up of diabetes. Weight down 8 lbs since last visit in 1/23. Has been cutting back on salt intake by not adding salt and cutting back on crackers and chips. Also his portions are a little smaller. Has been taking tresiba 90 units at night and 84 units in the morning and having more fasting sugars under 90 recently with one in the 50s. Has been taking 48-50 units of novolog before dinner and did have a low sugar around 2 am one night. Compliant with BP and lipid regimen. Has not been on any calcium, mvi, or vitamin D. Has not been drinking much water and tends to have 2 diet [address] per day. Current Outpatient Medications   Medication Sig    Insulin Degludec (TRESIBA FLEXTOUCH) 200 UNIT/ML SOPN INJECT 84 UNITS UNDER THE SKIN IN THE MORNING AND 90 UNITS AT NIGHT    B-D UF III MINI PEN NEEDLES 31G X 5 MM MISC USE AS DIRECTED THREE TIMES A DAY    Lancets MISC Dispense whatever brand is covered by insurance. . Test 3 times daily Dx: E11.65    aspirin 81 MG EC tablet Take 2 tablets by mouth    atorvastatin (LIPITOR) 80 MG tablet TAKE 1 TABLET BY MOUTH EVERY DAY    carvedilol (COREG) 6.25 MG tablet Take 1 tablet by mouth 2 times daily    coenzyme Q10 200 MG CAPS capsule Take by mouth    diclofenac (VOLTAREN) 75 MG EC tablet Take 1 tablet by mouth 2 times daily    docusate (COLACE, DULCOLAX) 100 MG CAPS Take 200 mg by mouth    ferrous sulfate (IRON 325) 325 (65 Fe) MG tablet Take by mouth 2 times daily    insulin aspart (NOVOLOG) 100 UNIT/ML injection pen INJECT 48 UNITS UNDER THE SKIN BEFORE DINNER OR AS DIRECTED TO MAX 50 UNITS PER DAY--Dose change 01/30/23--updated med list--did not send prescription to the pharmacy    lansoprazole (PREVACID) 30 MG delayed release capsule Take by mouth every morning (before breakfast)    Liraglutide

## 2023-10-12 RX ORDER — ATORVASTATIN CALCIUM 80 MG/1
TABLET, FILM COATED ORAL
Qty: 90 TABLET | Refills: 3 | Status: SHIPPED | OUTPATIENT
Start: 2023-10-12

## 2023-10-28 RX ORDER — INSULIN ASPART 100 [IU]/ML
INJECTION, SOLUTION INTRAVENOUS; SUBCUTANEOUS
Qty: 45 ML | Refills: 3 | Status: SHIPPED | OUTPATIENT
Start: 2023-10-28

## 2024-01-30 LAB
ALBUMIN SERPL-MCNC: 4.5 G/DL (ref 3.8–4.8)
ALBUMIN/GLOB SERPL: 2 {RATIO} (ref 1.2–2.2)
ALP SERPL-CCNC: 117 IU/L (ref 44–121)
ALT SERPL-CCNC: 51 IU/L (ref 0–44)
AST SERPL-CCNC: 30 IU/L (ref 0–40)
BILIRUB SERPL-MCNC: 0.4 MG/DL (ref 0–1.2)
BUN SERPL-MCNC: 15 MG/DL (ref 8–27)
BUN/CREAT SERPL: 17 (ref 10–24)
CALCIUM SERPL-MCNC: 10.5 MG/DL (ref 8.6–10.2)
CHLORIDE SERPL-SCNC: 109 MMOL/L (ref 96–106)
CHOLEST SERPL-MCNC: 127 MG/DL (ref 100–199)
CO2 SERPL-SCNC: 19 MMOL/L (ref 20–29)
CREAT SERPL-MCNC: 0.88 MG/DL (ref 0.76–1.27)
EGFRCR SERPLBLD CKD-EPI 2021: 91 ML/MIN/1.73
GLOBULIN SER CALC-MCNC: 2.3 G/DL (ref 1.5–4.5)
GLUCOSE SERPL-MCNC: 98 MG/DL (ref 70–99)
HBA1C MFR BLD: 6.9 % (ref 4.8–5.6)
HDLC SERPL-MCNC: 43 MG/DL
LDLC SERPL CALC-MCNC: 67 MG/DL (ref 0–99)
POTASSIUM SERPL-SCNC: 4.4 MMOL/L (ref 3.5–5.2)
PROT SERPL-MCNC: 6.8 G/DL (ref 6–8.5)
SODIUM SERPL-SCNC: 145 MMOL/L (ref 134–144)
TRIGL SERPL-MCNC: 86 MG/DL (ref 0–149)
VLDLC SERPL CALC-MCNC: 17 MG/DL (ref 5–40)

## 2024-01-31 LAB
ALBUMIN/CREAT UR: 19 MG/G CREAT (ref 0–29)
CREAT UR-MCNC: 256.3 MG/DL
IMP & REVIEW OF LAB RESULTS: NORMAL
Lab: NORMAL
MICROALBUMIN UR-MCNC: 47.8 UG/ML

## 2024-02-01 DIAGNOSIS — E11.65 TYPE 2 DIABETES MELLITUS WITH HYPERGLYCEMIA, WITH LONG-TERM CURRENT USE OF INSULIN (HCC): ICD-10-CM

## 2024-02-01 DIAGNOSIS — Z79.4 TYPE 2 DIABETES MELLITUS WITH HYPERGLYCEMIA, WITH LONG-TERM CURRENT USE OF INSULIN (HCC): ICD-10-CM

## 2024-02-01 DIAGNOSIS — E78.5 HYPERLIPIDEMIA LDL GOAL <70: ICD-10-CM

## 2024-02-08 ENCOUNTER — HOSPITAL ENCOUNTER (OUTPATIENT)
Facility: HOSPITAL | Age: 74
Discharge: HOME | End: 2024-02-08
Attending: ORTHOPAEDIC SURGERY
Payer: COMMERCIAL

## 2024-02-08 DIAGNOSIS — I82.402 ACUTE DEEP VEIN THROMBOSIS (DVT) OF LEFT LOWER EXTREMITY, UNSPECIFIED VEIN (HCC): ICD-10-CM

## 2024-02-08 DIAGNOSIS — M79.605 LEFT LEG PAIN: ICD-10-CM

## 2024-02-08 PROCEDURE — 93971 EXTREMITY STUDY: CPT

## 2024-02-13 ENCOUNTER — OFFICE VISIT (OUTPATIENT)
Age: 74
End: 2024-02-13
Payer: COMMERCIAL

## 2024-02-13 VITALS
BODY MASS INDEX: 41.09 KG/M2 | HEIGHT: 72 IN | DIASTOLIC BLOOD PRESSURE: 70 MMHG | HEART RATE: 74 BPM | SYSTOLIC BLOOD PRESSURE: 120 MMHG | WEIGHT: 303.4 LBS

## 2024-02-13 DIAGNOSIS — E83.52 HYPERCALCEMIA: ICD-10-CM

## 2024-02-13 DIAGNOSIS — E78.5 HYPERLIPIDEMIA LDL GOAL <70: ICD-10-CM

## 2024-02-13 DIAGNOSIS — I10 ESSENTIAL (PRIMARY) HYPERTENSION: ICD-10-CM

## 2024-02-13 DIAGNOSIS — Z79.4 TYPE 2 DIABETES MELLITUS WITH HYPERGLYCEMIA, WITH LONG-TERM CURRENT USE OF INSULIN (HCC): Primary | ICD-10-CM

## 2024-02-13 DIAGNOSIS — E11.65 TYPE 2 DIABETES MELLITUS WITH HYPERGLYCEMIA, WITH LONG-TERM CURRENT USE OF INSULIN (HCC): Primary | ICD-10-CM

## 2024-02-13 PROCEDURE — 3078F DIAST BP <80 MM HG: CPT | Performed by: INTERNAL MEDICINE

## 2024-02-13 PROCEDURE — 99214 OFFICE O/P EST MOD 30 MIN: CPT | Performed by: INTERNAL MEDICINE

## 2024-02-13 PROCEDURE — 3044F HG A1C LEVEL LT 7.0%: CPT | Performed by: INTERNAL MEDICINE

## 2024-02-13 PROCEDURE — 1123F ACP DISCUSS/DSCN MKR DOCD: CPT | Performed by: INTERNAL MEDICINE

## 2024-02-13 PROCEDURE — 3074F SYST BP LT 130 MM HG: CPT | Performed by: INTERNAL MEDICINE

## 2024-02-13 RX ORDER — BLOOD-GLUCOSE SENSOR
EACH MISCELLANEOUS
Qty: 2 EACH | Refills: 11 | Status: SHIPPED | OUTPATIENT
Start: 2024-02-13

## 2024-02-13 NOTE — PATIENT INSTRUCTIONS
1) Your Hemoglobin A1c (3 month test of blood sugar) is very good at 6.9% and you are not having as many low sugars so stay on the same doses of insulin and victoza and metformin.    2) BUN and creatinine are markers of kidney function.  Your values are normal.    3) ALT and AST are markers of liver function.  Your values are normal.    4) Your calcium is still high but the lowest in 1 year so we'll follow for now. Drink at least 4 8oz glasses of water everyday to stay hydrated to prevent your level from going higher.    5) Your urine and blood pressure and cholesterol are all at goal.    6) I downloaded the krysta 3 patsy for your phone to use as a reader and sent the [employer] sensors to your pharmacy to price out the cost.  If you end up getting these, I recommend check your sugar before and 2 hours after eating to see the effect of food on your sugar.  Do your best to focus on the meals that are causing you to spike over 180 when checked 2 hours after a meal and limit your portions of these foods.

## 2024-02-13 NOTE — PROGRESS NOTES
Chief Complaint   Patient presents with    Diabetes     PCP and pharmacy confirmed     History of Present Illness: Lizzy Tony is a 73 y.o. male here for follow up of diabetes.  Weight up 5 lbs since last visit in 8/23.  Has been taking novolog 46-48 units before dinner and may adjust the evening tresiba up to 88 units if he has a higher carb dinner like pasta but otherwise stays at 84 units and takes 84 units in the morning along with metformin 1 tab and victoza 1.8 mg daily.  Has had more pain in his left leg ever since falling in a hole about 8 months ago.  Has seen fasting sugars are in the 140-150s at time but other times can be .  May have a reading under 80 on occasion 2-3 times over the past 6 months.  Would like to try qi 3 to help with his control.  He will look to see if ozempic is covered in place of victoza.    he has the following indications to begin treatment with Freestyle Qi:  1) he has type 2 diabetes (E11.65) and is on an intensive insulin regimen with 3 injections per day  2) he tests his blood sugar 3 times per day and makes treatment decisions off his blood sugar readings and will do the same off freestyle qi sensor readings  3) he will require frequent adjustments to his insulin injection doses based on his freestyle qi sensor readings  4) he will benefit from therapeutic continuous glucose monitoring and I recommend that he begin this  5) he is seen in my office every 6 months        Current Outpatient Medications   Medication Sig    VICTOZA 18 MG/3ML SOPN SC injection INJECT 1.8 MG UNDER THE SKIN ONCE DAILY    NOVOLOG FLEXPEN 100 UNIT/ML injection pen INJECT 46 UNITS UNDER THE SKIN BEFORE DINNER OR AS DIRECTED TO MAX 50 UNITS PER DAY    atorvastatin (LIPITOR) 80 MG tablet TAKE 1 TABLET BY MOUTH EVERY DAY    Insulin Degludec (TRESIBA FLEXTOUCH) 200 UNIT/ML SOPN INJECT 84 UNITS UNDER THE SKIN IN THE MORNING AND 84 UNITS AT NIGHT--Dose change 08/15/23--updated med

## 2024-02-16 ENCOUNTER — TELEPHONE (OUTPATIENT)
Age: 74
End: 2024-02-16

## 2024-02-16 NOTE — TELEPHONE ENCOUNTER
Patient spouse called and left a voice message stating that the Ozempic per [employer] insurance will need a PA.

## 2024-02-22 RX ORDER — SEMAGLUTIDE 0.68 MG/ML
INJECTION, SOLUTION SUBCUTANEOUS
Qty: 9 ML | Refills: 3 | Status: SHIPPED | OUTPATIENT
Start: 2024-02-22

## 2024-02-22 RX ORDER — ACYCLOVIR 400 MG/1
TABLET ORAL
Qty: 9 EACH | Refills: 3 | Status: SHIPPED | OUTPATIENT
Start: 2024-02-22

## 2024-02-22 NOTE — TELEPHONE ENCOUNTER
Please call patient or his wife to let him know that we received a fax that the freestyle krysta is not covered but dexcom is covered so I sent dexcom G7 sensors to [facility].  These will be applied to the back of the arm every 10 days.  He will need to download the dexcom G7 patsy for his phone to be able to use these sensors.    I also sent ozempic pens to [facility] and if they require a prior authorization then we will work on it.  If they get approved, then he still stop the vicotza the day before he decides to start the ozempic and he will dose 0.5 mg once a week on a day of his choice either before or after a meal.  All the instructions on how to use this pen are on the box that comes with the pen.

## 2024-02-22 NOTE — TELEPHONE ENCOUNTER
Patient's wife notified of message per Dr. Iqbal and voiced understanding of what was read to them.

## 2024-06-12 RX ORDER — INSULIN DEGLUDEC 200 U/ML
INJECTION, SOLUTION SUBCUTANEOUS
Qty: 81 ML | Refills: 3 | Status: SHIPPED | OUTPATIENT
Start: 2024-06-12

## 2024-07-28 DIAGNOSIS — Z79.4 TYPE 2 DIABETES MELLITUS WITH HYPERGLYCEMIA, WITH LONG-TERM CURRENT USE OF INSULIN (HCC): ICD-10-CM

## 2024-07-28 DIAGNOSIS — E78.5 HYPERLIPIDEMIA LDL GOAL <70: ICD-10-CM

## 2024-07-28 DIAGNOSIS — E83.52 HYPERCALCEMIA: ICD-10-CM

## 2024-07-28 DIAGNOSIS — E11.65 TYPE 2 DIABETES MELLITUS WITH HYPERGLYCEMIA, WITH LONG-TERM CURRENT USE OF INSULIN (HCC): ICD-10-CM

## 2024-07-28 DIAGNOSIS — I10 ESSENTIAL (PRIMARY) HYPERTENSION: ICD-10-CM

## 2024-08-07 ENCOUNTER — TELEPHONE (OUTPATIENT)
Age: 74
End: 2024-08-07

## 2024-08-07 NOTE — TELEPHONE ENCOUNTER
So was he getting falsely low readings on his continuous glucose monitor?  I'm inclined to leave his dose the same until I see him next week.

## 2024-08-07 NOTE — TELEPHONE ENCOUNTER
Pt LVM stating he is having a low blood glucose reading and it is not coming up no matter what he does.   Contacted pt and he stated he rechecked his blood glucose with his actual glucometer and it showed his blood glucose was actually 230. Pt says he is currently taking Tresiba 80 BID and Novolog 28 units daily due to losing a little bit of weight. Pt states he has an appt next week and had labs drawn today.

## 2024-08-08 LAB
BUN SERPL-MCNC: 16 MG/DL (ref 8–27)
BUN/CREAT SERPL: 19 (ref 10–24)
CALCIUM SERPL-MCNC: 10 MG/DL (ref 8.6–10.2)
CHLORIDE SERPL-SCNC: 109 MMOL/L (ref 96–106)
CO2 SERPL-SCNC: 22 MMOL/L (ref 20–29)
CREAT SERPL-MCNC: 0.83 MG/DL (ref 0.76–1.27)
EGFRCR SERPLBLD CKD-EPI 2021: 93 ML/MIN/1.73
GLUCOSE SERPL-MCNC: 180 MG/DL (ref 70–99)
HBA1C MFR BLD: 6.3 % (ref 4.8–5.6)
POTASSIUM SERPL-SCNC: 4.3 MMOL/L (ref 3.5–5.2)
SODIUM SERPL-SCNC: 143 MMOL/L (ref 134–144)

## 2024-08-08 NOTE — TELEPHONE ENCOUNTER
Pt states he applied a new sensor yesterday and the readings are much closer to his glucometer. He states he has not had a low reading since changing the sensor.  Pt states he will continue his insulin regimen until he sees Dr Iqbal.

## 2024-08-13 ENCOUNTER — OFFICE VISIT (OUTPATIENT)
Age: 74
End: 2024-08-13
Payer: COMMERCIAL

## 2024-08-13 VITALS
BODY MASS INDEX: 40.09 KG/M2 | HEART RATE: 66 BPM | SYSTOLIC BLOOD PRESSURE: 134 MMHG | WEIGHT: 296 LBS | DIASTOLIC BLOOD PRESSURE: 80 MMHG | HEIGHT: 72 IN

## 2024-08-13 DIAGNOSIS — I10 ESSENTIAL (PRIMARY) HYPERTENSION: ICD-10-CM

## 2024-08-13 DIAGNOSIS — E78.5 HYPERLIPIDEMIA LDL GOAL <70: ICD-10-CM

## 2024-08-13 DIAGNOSIS — Z79.4 TYPE 2 DIABETES MELLITUS WITH HYPERGLYCEMIA, WITH LONG-TERM CURRENT USE OF INSULIN (HCC): Primary | ICD-10-CM

## 2024-08-13 DIAGNOSIS — E11.65 TYPE 2 DIABETES MELLITUS WITH HYPERGLYCEMIA, WITH LONG-TERM CURRENT USE OF INSULIN (HCC): Primary | ICD-10-CM

## 2024-08-13 DIAGNOSIS — E83.52 HYPERCALCEMIA: ICD-10-CM

## 2024-08-13 PROCEDURE — 3078F DIAST BP <80 MM HG: CPT | Performed by: INTERNAL MEDICINE

## 2024-08-13 PROCEDURE — 3075F SYST BP GE 130 - 139MM HG: CPT | Performed by: INTERNAL MEDICINE

## 2024-08-13 PROCEDURE — 3044F HG A1C LEVEL LT 7.0%: CPT | Performed by: INTERNAL MEDICINE

## 2024-08-13 PROCEDURE — 99214 OFFICE O/P EST MOD 30 MIN: CPT | Performed by: INTERNAL MEDICINE

## 2024-08-13 PROCEDURE — 1123F ACP DISCUSS/DSCN MKR DOCD: CPT | Performed by: INTERNAL MEDICINE

## 2024-08-13 RX ORDER — SEMAGLUTIDE 1.34 MG/ML
1 INJECTION, SOLUTION SUBCUTANEOUS
Qty: 9 ML | Refills: 3 | Status: SHIPPED | OUTPATIENT
Start: 2024-08-13

## 2024-08-13 RX ORDER — INSULIN DEGLUDEC 200 U/ML
INJECTION, SOLUTION SUBCUTANEOUS
Qty: 81 ML | Refills: 3 | Status: SHIPPED | OUTPATIENT
Start: 2024-08-13

## 2024-08-13 NOTE — PROGRESS NOTES
Chief Complaint   Patient presents with    Diabetes     PCP and pharmacy confirmed     History of Present Illness: Lizzy Tony is a 73 y.o. male here for follow up of diabetes.  Weight down 7 lbs since last visit in 2/24.  Has been off the victoza and taking 0.5 mg of ozempic every Wednesday and hasn't missed any doses.  Has been able to decrease his tresiba to 80 units twice daily and his novolog down to 30 units before dinner.  Fasting sugars are in the  range.  Has had some increase in constipation with starting the ozempic but tolerable.  Has enjoyed using the dexcom.  Did have a faulty sensor last week that said he was low in the 40s but didn't feel bad but started treating it with soda and when he checked his sugar on his finger it was over 200.  Once he changed the sensor, everything went back to normal.  Compliant with BP and lipid regimen.  Just had a recent ECHO that said his EF was 50-55%    he has the following indications to continue treatment with Dexcom:  1) he has type 2 diabetes (E11.65) and is on an intensive insulin regimen with 3 injections per day  2) he tests his blood sugar 3 times per day and makes treatment decisions off his blood sugar readings and sensor readings  3) he requires frequent adjustments to his insulin injection doses based on his sensor readings  4) he has benefited from therapeutic continuous glucose monitoring and I recommend that he continue this  5) he is seen in my office every 6 months        Current Outpatient Medications   Medication Sig    Semaglutide, 1 MG/DOSE, (OZEMPIC, 1 MG/DOSE,) 4 MG/3ML SOPN sc injection Inject 1 mg into the skin every 7 days Replaces the 0.5 mg dose    Insulin Degludec (TRESIBA FLEXTOUCH) 200 UNIT/ML SOPN INJECT 76 UNITS UNDER THE SKIN TWICE DAILY--Dose change 08/13/24--updated med list--did not send prescription to the pharmacy    metFORMIN (GLUCOPHAGE) 1000 MG tablet TAKE 1 TABLET BY MOUTH TWICE A DAY WITH MEALS    Continuous Blood

## 2024-08-13 NOTE — PATIENT INSTRUCTIONS
1) Your Hemoglobin A1c (3 month test of blood sugar) is 6.3% down from 6.9% and is the 2nd best in 14 years.    2) Take 2 of the 0.5 mg ozempic doses back to back in different locations on the day you are due for your next injection and then do this again the following weeks to use up your 0.5 mg pens.  I sent the 1 mg pens to the [facility] pharmacy.    3) Tomorrow, decrease the tresiba to 76 units twice daily and the novolog to 28 units.  Watch your sugars over the next 2 weeks on the higher dose of ozempic and if your sugars are going under 90 in the morning then further decrease your tresiba by 4 units with each dose to get to the lowest dose that keeps you  in the morning.    Watch your after dinner sugars and see if they are going under 110 and if so, then decrease the novolog by 2 units as needed to get to the lowest dose that keeps you 110-150 after dinner.      4) BUN and creatinine are markers of kidney function.  Your values are normal.    5) If you have too much nausea or upset stomach or diarrhea or constipation on the 1 mg dose of ozempic, then plan on decreasing the metformin to 1/2 tab in the morning and 1 tab at night.      6) You can Dr. Adrian Kaur at [phone] or Dr. Kushal Stevens [phone] or Dr. Chino Mcghee [phone] or Dr. Tor Metz at [phone] for podiatry.

## 2024-09-05 RX ORDER — ATORVASTATIN CALCIUM 80 MG/1
TABLET, FILM COATED ORAL
Qty: 90 TABLET | Refills: 3 | Status: SHIPPED | OUTPATIENT
Start: 2024-09-05

## 2024-10-10 RX ORDER — INSULIN ASPART 100 [IU]/ML
INJECTION, SOLUTION INTRAVENOUS; SUBCUTANEOUS
Qty: 45 ML | Refills: 3 | Status: SHIPPED | OUTPATIENT
Start: 2024-10-10

## 2024-10-10 RX ORDER — FLURBIPROFEN SODIUM 0.3 MG/ML
SOLUTION/ DROPS OPHTHALMIC
Qty: 300 EACH | Refills: 3 | Status: SHIPPED | OUTPATIENT
Start: 2024-10-10

## 2024-12-23 RX ORDER — ACYCLOVIR 400 MG/1
TABLET ORAL
Qty: 9 EACH | Refills: 3 | Status: SHIPPED | OUTPATIENT
Start: 2024-12-23

## 2025-02-03 DIAGNOSIS — E78.5 HYPERLIPIDEMIA LDL GOAL <70: ICD-10-CM

## 2025-02-03 DIAGNOSIS — E11.65 TYPE 2 DIABETES MELLITUS WITH HYPERGLYCEMIA, WITH LONG-TERM CURRENT USE OF INSULIN (HCC): ICD-10-CM

## 2025-02-03 DIAGNOSIS — Z79.4 TYPE 2 DIABETES MELLITUS WITH HYPERGLYCEMIA, WITH LONG-TERM CURRENT USE OF INSULIN (HCC): ICD-10-CM

## 2025-02-03 DIAGNOSIS — I10 ESSENTIAL (PRIMARY) HYPERTENSION: ICD-10-CM

## 2025-02-03 DIAGNOSIS — E83.52 HYPERCALCEMIA: ICD-10-CM

## 2025-02-12 LAB
ALBUMIN SERPL-MCNC: 4.3 G/DL (ref 3.8–4.8)
ALBUMIN/CREAT UR: 31 MG/G CREAT (ref 0–29)
ALP SERPL-CCNC: 125 IU/L (ref 44–121)
ALT SERPL-CCNC: 51 IU/L (ref 0–44)
AST SERPL-CCNC: 45 IU/L (ref 0–40)
BILIRUB SERPL-MCNC: 0.5 MG/DL (ref 0–1.2)
BUN SERPL-MCNC: 15 MG/DL (ref 8–27)
BUN/CREAT SERPL: 18 (ref 10–24)
CALCIUM SERPL-MCNC: 10 MG/DL (ref 8.6–10.2)
CHLORIDE SERPL-SCNC: 106 MMOL/L (ref 96–106)
CHOLEST SERPL-MCNC: 110 MG/DL (ref 100–199)
CO2 SERPL-SCNC: 23 MMOL/L (ref 20–29)
CREAT SERPL-MCNC: 0.82 MG/DL (ref 0.76–1.27)
CREAT UR-MCNC: 166.4 MG/DL
EGFRCR SERPLBLD CKD-EPI 2021: 93 ML/MIN/1.73
GLOBULIN SER CALC-MCNC: 2.1 G/DL (ref 1.5–4.5)
GLUCOSE SERPL-MCNC: 165 MG/DL (ref 70–99)
HBA1C MFR BLD: 6.9 % (ref 4.8–5.6)
HDLC SERPL-MCNC: 42 MG/DL
IMP & REVIEW OF LAB RESULTS: NORMAL
LDLC SERPL CALC-MCNC: 49 MG/DL (ref 0–99)
Lab: NORMAL
MICROALBUMIN UR-MCNC: 51.1 UG/ML
POTASSIUM SERPL-SCNC: 4.7 MMOL/L (ref 3.5–5.2)
PROT SERPL-MCNC: 6.4 G/DL (ref 6–8.5)
SODIUM SERPL-SCNC: 144 MMOL/L (ref 134–144)
TRIGL SERPL-MCNC: 103 MG/DL (ref 0–149)
VLDLC SERPL CALC-MCNC: 19 MG/DL (ref 5–40)

## 2025-02-18 ENCOUNTER — OFFICE VISIT (OUTPATIENT)
Age: 75
End: 2025-02-18
Payer: COMMERCIAL

## 2025-02-18 VITALS
HEART RATE: 75 BPM | SYSTOLIC BLOOD PRESSURE: 130 MMHG | HEIGHT: 72 IN | WEIGHT: 293.2 LBS | BODY MASS INDEX: 39.71 KG/M2 | DIASTOLIC BLOOD PRESSURE: 76 MMHG

## 2025-02-18 DIAGNOSIS — E78.5 HYPERLIPIDEMIA LDL GOAL <70: ICD-10-CM

## 2025-02-18 DIAGNOSIS — I10 ESSENTIAL (PRIMARY) HYPERTENSION: ICD-10-CM

## 2025-02-18 DIAGNOSIS — E83.52 HYPERCALCEMIA: ICD-10-CM

## 2025-02-18 DIAGNOSIS — Z79.4 TYPE 2 DIABETES MELLITUS WITH HYPERGLYCEMIA, WITH LONG-TERM CURRENT USE OF INSULIN (HCC): Primary | ICD-10-CM

## 2025-02-18 DIAGNOSIS — E11.65 TYPE 2 DIABETES MELLITUS WITH HYPERGLYCEMIA, WITH LONG-TERM CURRENT USE OF INSULIN (HCC): Primary | ICD-10-CM

## 2025-02-18 PROCEDURE — 3078F DIAST BP <80 MM HG: CPT | Performed by: INTERNAL MEDICINE

## 2025-02-18 PROCEDURE — 99214 OFFICE O/P EST MOD 30 MIN: CPT | Performed by: INTERNAL MEDICINE

## 2025-02-18 PROCEDURE — 3044F HG A1C LEVEL LT 7.0%: CPT | Performed by: INTERNAL MEDICINE

## 2025-02-18 PROCEDURE — 95251 CONT GLUC MNTR ANALYSIS I&R: CPT | Performed by: INTERNAL MEDICINE

## 2025-02-18 PROCEDURE — 3075F SYST BP GE 130 - 139MM HG: CPT | Performed by: INTERNAL MEDICINE

## 2025-02-18 PROCEDURE — 1123F ACP DISCUSS/DSCN MKR DOCD: CPT | Performed by: INTERNAL MEDICINE

## 2025-02-18 RX ORDER — INSULIN ASPART 100 [IU]/ML
INJECTION, SOLUTION INTRAVENOUS; SUBCUTANEOUS
Qty: 45 ML | Refills: 3 | Status: SHIPPED | OUTPATIENT
Start: 2025-02-18

## 2025-02-18 NOTE — PROGRESS NOTES
Chief Complaint   Patient presents with    Diabetes     PCP and pharmacy confirmed  Pt consented to use of MITCH     History of Present Illness: Lizzy Tony is a 74 y.o. male here for follow up of diabetes.  Weight down 3 lbs since last visit in 8/24.  Review of his dexcom data over the past 90 days shows 65% in range, 34% high, 1% very high, 0% low and 0% very low.    he has the following indications to continue treatment with Dexcom:  1) he has type 2 diabetes (E11.65) and is on an intensive insulin regimen with 3 injections per day  2) he tests his blood sugar 3 times per day and makes treatment decisions off his blood sugar readings and sensor readings  3) he requires frequent adjustments to his insulin injection doses based on his sensor readings  4) he has benefited from therapeutic continuous glucose monitoring and I recommend that he continue this  5) he is seen in my office every 6 months        History of Present Illness  The patient is a 74-year-old male here for a follow-up of diabetes.    He continues to utilize the Dexcom system for glucose monitoring, which he has been consistently wearing over the past 3 months. Approximately 1 month ago, he experienced elevated glucose levels, reaching up to 290, prompting him to increase his Tresiba dosage from 76 to 80 units twice daily. This adjustment appears to have been effective. He also increased his NovoLog dosage from 20 to 35 units before dinner. He administers Ozempic 1 mg every Wednesday, in combination with metformin 1000 mg twice daily. He reports no gastrointestinal symptoms such as nausea, diarrhea, or abdominal pain, but does experience constipation, which he considers normal for him. He has lost approximately 20 pounds. He underwent an eye examination about 6 months ago and received new glasses. He was informed of early-stage cataracts but was not advised to undergo laser surgery.    He is currently on Entresto and carvedilol for heart failure.

## 2025-02-18 NOTE — PATIENT INSTRUCTIONS
1) Your Hemoglobin A1c (3 month test of blood sugar) is 6.9% which is still at goal of 7-7.5%.    2) Take 2 of the 1.0 mg ozempic doses back to back in different locations on the day you are due for your next injection and do this again to use up the 1 mg pens that you have.  I sent the 2 mg pens to the pharmacy to start when you are all done with the 1 mg pens.    3) Tomorrow, decrease the tresiba to 76 units twice daily and the novolog to 30 units.  Watch your sugars over the next 2 weeks on the higher dose of ozempic and if your sugars are going under 90 in the morning then further decrease your tresiba by 4 units with each dose to get to the lowest dose that keeps you  in the morning.    Watch your after dinner sugars and see if they are going under 110 and if so, then decrease the novolog by 2 units as needed to get to the lowest dose that keeps you 110-150 after dinner.    4) BUN and creatinine are markers of kidney function.  Your values are normal.    5) ALT and AST are markers of liver function.  Your values are likely elevated due to fatty deposition in the liver that can occur with weight gain.  Hopefully with weight loss, these values will return to normal.    6) Your urine protein is 31 and normal is 30 and this should come back down with a lower A1c.    7) Your calcium remains normal at 10.0

## 2025-06-09 RX ORDER — INSULIN DEGLUDEC 200 U/ML
INJECTION, SOLUTION SUBCUTANEOUS
Qty: 81 ML | Refills: 3 | Status: SHIPPED | OUTPATIENT
Start: 2025-06-09

## 2025-06-20 RX ORDER — ATORVASTATIN CALCIUM 80 MG/1
80 TABLET, FILM COATED ORAL DAILY
Qty: 90 TABLET | Refills: 3 | Status: SHIPPED | OUTPATIENT
Start: 2025-06-20

## 2025-08-14 LAB
EST. AVERAGE GLUCOSE BLD GHB EST-MCNC: 140 MG/DL
HBA1C MFR BLD: 6.5 % (ref 4.8–5.6)

## 2025-08-15 DIAGNOSIS — I10 ESSENTIAL (PRIMARY) HYPERTENSION: ICD-10-CM

## 2025-08-15 DIAGNOSIS — E11.65 TYPE 2 DIABETES MELLITUS WITH HYPERGLYCEMIA, WITH LONG-TERM CURRENT USE OF INSULIN (HCC): ICD-10-CM

## 2025-08-15 DIAGNOSIS — Z79.4 TYPE 2 DIABETES MELLITUS WITH HYPERGLYCEMIA, WITH LONG-TERM CURRENT USE OF INSULIN (HCC): ICD-10-CM

## 2025-08-15 DIAGNOSIS — E78.5 HYPERLIPIDEMIA LDL GOAL <70: ICD-10-CM

## 2025-08-15 DIAGNOSIS — E83.52 HYPERCALCEMIA: ICD-10-CM

## 2025-08-15 LAB
ALBUMIN SERPL-MCNC: 4.1 G/DL (ref 3.8–4.8)
ALBUMIN/CREAT UR: 16 MG/G CREAT (ref 0–29)
ALP SERPL-CCNC: 104 IU/L (ref 44–121)
ALT SERPL-CCNC: 37 IU/L (ref 0–44)
AST SERPL-CCNC: 28 IU/L (ref 0–40)
BILIRUB SERPL-MCNC: 0.5 MG/DL (ref 0–1.2)
BUN SERPL-MCNC: 16 MG/DL (ref 8–27)
BUN/CREAT SERPL: 18 (ref 10–24)
CALCIUM SERPL-MCNC: 10 MG/DL (ref 8.6–10.2)
CHLORIDE SERPL-SCNC: 104 MMOL/L (ref 96–106)
CHOLEST SERPL-MCNC: 96 MG/DL (ref 100–199)
CO2 SERPL-SCNC: 21 MMOL/L (ref 20–29)
CREAT SERPL-MCNC: 0.89 MG/DL (ref 0.76–1.27)
CREAT UR-MCNC: 188.9 MG/DL
EGFRCR SERPLBLD CKD-EPI 2021: 90 ML/MIN/1.73
GLOBULIN SER CALC-MCNC: 2.2 G/DL (ref 1.5–4.5)
GLUCOSE SERPL-MCNC: 113 MG/DL (ref 70–99)
HDLC SERPL-MCNC: 35 MG/DL
IMP & REVIEW OF LAB RESULTS: NORMAL
LDLC SERPL CALC-MCNC: 44 MG/DL (ref 0–99)
Lab: NORMAL
MICROALBUMIN UR-MCNC: 29.6 UG/ML
POTASSIUM SERPL-SCNC: 4.4 MMOL/L (ref 3.5–5.2)
PROT SERPL-MCNC: 6.3 G/DL (ref 6–8.5)
SODIUM SERPL-SCNC: 138 MMOL/L (ref 134–144)
TRIGL SERPL-MCNC: 81 MG/DL (ref 0–149)
VLDLC SERPL CALC-MCNC: 17 MG/DL (ref 5–40)

## 2025-08-26 ENCOUNTER — OFFICE VISIT (OUTPATIENT)
Age: 75
End: 2025-08-26
Payer: MEDICARE

## 2025-08-26 VITALS
HEIGHT: 72 IN | DIASTOLIC BLOOD PRESSURE: 75 MMHG | WEIGHT: 285 LBS | BODY MASS INDEX: 38.6 KG/M2 | HEART RATE: 74 BPM | SYSTOLIC BLOOD PRESSURE: 138 MMHG

## 2025-08-26 DIAGNOSIS — Z79.4 TYPE 2 DIABETES MELLITUS WITH HYPERGLYCEMIA, WITH LONG-TERM CURRENT USE OF INSULIN (HCC): Primary | ICD-10-CM

## 2025-08-26 DIAGNOSIS — E78.5 HYPERLIPIDEMIA LDL GOAL <70: ICD-10-CM

## 2025-08-26 DIAGNOSIS — E83.52 HYPERCALCEMIA: ICD-10-CM

## 2025-08-26 DIAGNOSIS — E11.65 TYPE 2 DIABETES MELLITUS WITH HYPERGLYCEMIA, WITH LONG-TERM CURRENT USE OF INSULIN (HCC): Primary | ICD-10-CM

## 2025-08-26 DIAGNOSIS — I10 ESSENTIAL (PRIMARY) HYPERTENSION: ICD-10-CM

## 2025-08-26 PROCEDURE — 95251 CONT GLUC MNTR ANALYSIS I&R: CPT | Performed by: INTERNAL MEDICINE

## 2025-08-26 PROCEDURE — 1126F AMNT PAIN NOTED NONE PRSNT: CPT | Performed by: INTERNAL MEDICINE

## 2025-08-26 PROCEDURE — 3078F DIAST BP <80 MM HG: CPT | Performed by: INTERNAL MEDICINE

## 2025-08-26 PROCEDURE — 3075F SYST BP GE 130 - 139MM HG: CPT | Performed by: INTERNAL MEDICINE

## 2025-08-26 PROCEDURE — 1123F ACP DISCUSS/DSCN MKR DOCD: CPT | Performed by: INTERNAL MEDICINE

## 2025-08-26 PROCEDURE — 99214 OFFICE O/P EST MOD 30 MIN: CPT | Performed by: INTERNAL MEDICINE

## 2025-08-26 PROCEDURE — 3044F HG A1C LEVEL LT 7.0%: CPT | Performed by: INTERNAL MEDICINE

## 2025-08-26 PROCEDURE — 1160F RVW MEDS BY RX/DR IN RCRD: CPT | Performed by: INTERNAL MEDICINE

## 2025-08-26 PROCEDURE — G2211 COMPLEX E/M VISIT ADD ON: HCPCS | Performed by: INTERNAL MEDICINE

## 2025-08-26 PROCEDURE — 1159F MED LIST DOCD IN RCRD: CPT | Performed by: INTERNAL MEDICINE

## 2025-08-26 RX ORDER — INSULIN DEGLUDEC 200 U/ML
INJECTION, SOLUTION SUBCUTANEOUS
Qty: 81 ML | Refills: 3 | Status: SHIPPED | OUTPATIENT
Start: 2025-08-26

## (undated) DEVICE — CATHETER ANGIO JR4 AD 5 FRX100 CM 25 CM PERFORMA

## (undated) DEVICE — ANGIOGRAPHY KIT CUST [K0910930B] [MERIT MEDICAL SYSTEMS INC]

## (undated) DEVICE — PINNACLE INTRODUCER SHEATH: Brand: PINNACLE

## (undated) DEVICE — GUIDEWIRE VASC L145CM 0.035IN J TIP L3MM PTFE FIX COR NAMIC

## (undated) DEVICE — CTRL CARD MRK LIQCK + LEV 3 --

## (undated) DEVICE — Device

## (undated) DEVICE — SYR POWER 150ML 8IN FILL TUBE --

## (undated) DEVICE — NDL PERC VASC ACC 18GX2.75I --

## (undated) DEVICE — CATHETER ETER CARD MULTIPAK MULTIPAK 5FR PERFORMA

## (undated) DEVICE — 3M™ TEGADERM™ TRANSPARENT FILM DRESSING FRAME STYLE, 1626W, 4 IN X 4-3/4 IN (10 CM X 12 CM), 50/CT 4CT/CASE: Brand: 3M™ TEGADERM™

## (undated) DEVICE — DRESSING HEMOSTATIC SFT INTVENT W/O SLT DBL WRP QUIKCLOT LF

## (undated) DEVICE — PACK PROCEDURE SURG HRT CATH